# Patient Record
Sex: FEMALE | Race: WHITE | NOT HISPANIC OR LATINO | Employment: OTHER
[De-identification: names, ages, dates, MRNs, and addresses within clinical notes are randomized per-mention and may not be internally consistent; named-entity substitution may affect disease eponyms.]

---

## 2017-06-10 ENCOUNTER — HEALTH MAINTENANCE LETTER (OUTPATIENT)
Age: 52
End: 2017-06-10

## 2022-08-20 ENCOUNTER — HOSPITAL ENCOUNTER (INPATIENT)
Facility: CLINIC | Age: 57
LOS: 2 days | Discharge: HOME | DRG: 897 | End: 2022-08-22
Attending: EMERGENCY MEDICINE | Admitting: PSYCHIATRY & NEUROLOGY
Payer: MEDICARE

## 2022-08-20 ENCOUNTER — TELEPHONE (OUTPATIENT)
Dept: BEHAVIORAL HEALTH | Facility: CLINIC | Age: 57
End: 2022-08-20

## 2022-08-20 DIAGNOSIS — F10.220 ALCOHOL DEPENDENCE WITH UNCOMPLICATED INTOXICATION (H): ICD-10-CM

## 2022-08-20 DIAGNOSIS — Z11.52 ENCOUNTER FOR SCREENING LABORATORY TESTING FOR SEVERE ACUTE RESPIRATORY SYNDROME CORONAVIRUS 2 (SARS-COV-2): ICD-10-CM

## 2022-08-20 LAB
ALBUMIN SERPL-MCNC: 3.7 G/DL (ref 3.4–5)
ALP SERPL-CCNC: 141 U/L (ref 40–150)
ALT SERPL W P-5'-P-CCNC: 73 U/L (ref 0–50)
AMPHETAMINES UR QL SCN: ABNORMAL
ANION GAP SERPL CALCULATED.3IONS-SCNC: 7 MMOL/L (ref 3–14)
AST SERPL W P-5'-P-CCNC: 96 U/L (ref 0–45)
BARBITURATES UR QL: ABNORMAL
BASOPHILS # BLD AUTO: 0.1 10E3/UL (ref 0–0.2)
BASOPHILS NFR BLD AUTO: 1 %
BENZODIAZ UR QL: ABNORMAL
BILIRUB SERPL-MCNC: 0.7 MG/DL (ref 0.2–1.3)
BUN SERPL-MCNC: 7 MG/DL (ref 7–30)
CALCIUM SERPL-MCNC: 8.9 MG/DL (ref 8.5–10.1)
CANNABINOIDS UR QL SCN: ABNORMAL
CHLORIDE BLD-SCNC: 98 MMOL/L (ref 94–109)
CO2 SERPL-SCNC: 30 MMOL/L (ref 20–32)
COCAINE UR QL: ABNORMAL
CREAT SERPL-MCNC: 0.83 MG/DL (ref 0.52–1.04)
EOSINOPHIL # BLD AUTO: 0.2 10E3/UL (ref 0–0.7)
EOSINOPHIL NFR BLD AUTO: 4 %
ERYTHROCYTE [DISTWIDTH] IN BLOOD BY AUTOMATED COUNT: 14.7 % (ref 10–15)
ETHANOL SERPL-MCNC: 0.07 G/DL
GFR SERPL CREATININE-BSD FRML MDRD: 82 ML/MIN/1.73M2
GLUCOSE BLD-MCNC: 91 MG/DL (ref 70–99)
HCT VFR BLD AUTO: 43.8 % (ref 35–47)
HGB BLD-MCNC: 15.1 G/DL (ref 11.7–15.7)
IMM GRANULOCYTES # BLD: 0 10E3/UL
IMM GRANULOCYTES NFR BLD: 0 %
LIPASE SERPL-CCNC: 96 U/L (ref 73–393)
LYMPHOCYTES # BLD AUTO: 1.1 10E3/UL (ref 0.8–5.3)
LYMPHOCYTES NFR BLD AUTO: 21 %
MAGNESIUM SERPL-MCNC: 2.2 MG/DL (ref 1.6–2.3)
MCH RBC QN AUTO: 29.5 PG (ref 26.5–33)
MCHC RBC AUTO-ENTMCNC: 34.5 G/DL (ref 31.5–36.5)
MCV RBC AUTO: 86 FL (ref 78–100)
MONOCYTES # BLD AUTO: 0.5 10E3/UL (ref 0–1.3)
MONOCYTES NFR BLD AUTO: 9 %
NEUTROPHILS # BLD AUTO: 3.5 10E3/UL (ref 1.6–8.3)
NEUTROPHILS NFR BLD AUTO: 65 %
NRBC # BLD AUTO: 0 10E3/UL
NRBC BLD AUTO-RTO: 0 /100
OPIATES UR QL SCN: ABNORMAL
PLATELET # BLD AUTO: 248 10E3/UL (ref 150–450)
POTASSIUM BLD-SCNC: 2.9 MMOL/L (ref 3.4–5.3)
PROT SERPL-MCNC: 7.3 G/DL (ref 6.8–8.8)
RBC # BLD AUTO: 5.12 10E6/UL (ref 3.8–5.2)
SARS-COV-2 RNA RESP QL NAA+PROBE: NEGATIVE
SODIUM SERPL-SCNC: 135 MMOL/L (ref 133–144)
WBC # BLD AUTO: 5.3 10E3/UL (ref 4–11)

## 2022-08-20 PROCEDURE — 250N000013 HC RX MED GY IP 250 OP 250 PS 637: Performed by: PSYCHIATRY & NEUROLOGY

## 2022-08-20 PROCEDURE — 83735 ASSAY OF MAGNESIUM: CPT | Performed by: EMERGENCY MEDICINE

## 2022-08-20 PROCEDURE — 250N000009 HC RX 250: Performed by: EMERGENCY MEDICINE

## 2022-08-20 PROCEDURE — 99223 1ST HOSP IP/OBS HIGH 75: CPT | Mod: AI | Performed by: PSYCHIATRY & NEUROLOGY

## 2022-08-20 PROCEDURE — 96375 TX/PRO/DX INJ NEW DRUG ADDON: CPT

## 2022-08-20 PROCEDURE — 99285 EMERGENCY DEPT VISIT HI MDM: CPT | Performed by: EMERGENCY MEDICINE

## 2022-08-20 PROCEDURE — 96365 THER/PROPH/DIAG IV INF INIT: CPT

## 2022-08-20 PROCEDURE — 85025 COMPLETE CBC W/AUTO DIFF WBC: CPT | Performed by: EMERGENCY MEDICINE

## 2022-08-20 PROCEDURE — 96361 HYDRATE IV INFUSION ADD-ON: CPT

## 2022-08-20 PROCEDURE — 82077 ASSAY SPEC XCP UR&BREATH IA: CPT | Performed by: EMERGENCY MEDICINE

## 2022-08-20 PROCEDURE — HZ2ZZZZ DETOXIFICATION SERVICES FOR SUBSTANCE ABUSE TREATMENT: ICD-10-PCS | Performed by: PSYCHIATRY & NEUROLOGY

## 2022-08-20 PROCEDURE — 250N000011 HC RX IP 250 OP 636: Performed by: EMERGENCY MEDICINE

## 2022-08-20 PROCEDURE — 250N000009 HC RX 250

## 2022-08-20 PROCEDURE — 87635 SARS-COV-2 COVID-19 AMP PRB: CPT | Performed by: EMERGENCY MEDICINE

## 2022-08-20 PROCEDURE — 250N000013 HC RX MED GY IP 250 OP 250 PS 637: Performed by: EMERGENCY MEDICINE

## 2022-08-20 PROCEDURE — 82040 ASSAY OF SERUM ALBUMIN: CPT | Performed by: EMERGENCY MEDICINE

## 2022-08-20 PROCEDURE — C9803 HOPD COVID-19 SPEC COLLECT: HCPCS

## 2022-08-20 PROCEDURE — 80307 DRUG TEST PRSMV CHEM ANLYZR: CPT | Performed by: EMERGENCY MEDICINE

## 2022-08-20 PROCEDURE — 80053 COMPREHEN METABOLIC PANEL: CPT | Performed by: EMERGENCY MEDICINE

## 2022-08-20 PROCEDURE — 36415 COLL VENOUS BLD VENIPUNCTURE: CPT | Performed by: EMERGENCY MEDICINE

## 2022-08-20 PROCEDURE — 83690 ASSAY OF LIPASE: CPT | Performed by: EMERGENCY MEDICINE

## 2022-08-20 PROCEDURE — 258N000001 HC RX 258: Performed by: EMERGENCY MEDICINE

## 2022-08-20 PROCEDURE — 128N000004 HC R&B CD ADULT

## 2022-08-20 PROCEDURE — 99285 EMERGENCY DEPT VISIT HI MDM: CPT | Mod: 25

## 2022-08-20 RX ORDER — SUMATRIPTAN 100 MG/1
100 TABLET, FILM COATED ORAL 2 TIMES DAILY PRN
Status: ON HOLD | COMMUNITY
Start: 2022-04-08 | End: 2022-08-22

## 2022-08-20 RX ORDER — ONDANSETRON 2 MG/ML
4 INJECTION INTRAMUSCULAR; INTRAVENOUS ONCE
Status: COMPLETED | OUTPATIENT
Start: 2022-08-20 | End: 2022-08-20

## 2022-08-20 RX ORDER — DIAZEPAM 5 MG
5-20 TABLET ORAL EVERY 30 MIN PRN
Status: DISCONTINUED | OUTPATIENT
Start: 2022-08-20 | End: 2022-08-22 | Stop reason: HOSPADM

## 2022-08-20 RX ORDER — HYDROCHLOROTHIAZIDE 25 MG/1
25 TABLET ORAL DAILY
COMMUNITY

## 2022-08-20 RX ORDER — SUMATRIPTAN 100 MG/1
100 TABLET, FILM COATED ORAL ONCE
Status: COMPLETED | OUTPATIENT
Start: 2022-08-20 | End: 2022-08-20

## 2022-08-20 RX ORDER — BUSPIRONE HYDROCHLORIDE 15 MG/1
30 TABLET ORAL 2 TIMES DAILY
Status: DISCONTINUED | OUTPATIENT
Start: 2022-08-20 | End: 2022-08-22 | Stop reason: HOSPADM

## 2022-08-20 RX ORDER — LEVOTHYROXINE SODIUM 150 UG/1
150 TABLET ORAL
Status: DISCONTINUED | OUTPATIENT
Start: 2022-08-21 | End: 2022-08-22 | Stop reason: HOSPADM

## 2022-08-20 RX ORDER — HYDROCHLOROTHIAZIDE 25 MG/1
25 TABLET ORAL DAILY
Status: DISCONTINUED | OUTPATIENT
Start: 2022-08-21 | End: 2022-08-22 | Stop reason: HOSPADM

## 2022-08-20 RX ORDER — LEVOTHYROXINE SODIUM 150 UG/1
150 TABLET ORAL DAILY
COMMUNITY

## 2022-08-20 RX ORDER — ACETAMINOPHEN 500 MG
500 TABLET ORAL 2 TIMES DAILY
Status: ON HOLD | COMMUNITY
End: 2022-08-22

## 2022-08-20 RX ORDER — FOLIC ACID 1 MG/1
1 TABLET ORAL DAILY
Status: DISCONTINUED | OUTPATIENT
Start: 2022-08-20 | End: 2022-08-22 | Stop reason: HOSPADM

## 2022-08-20 RX ORDER — HYDROXYZINE HYDROCHLORIDE 25 MG/1
25 TABLET, FILM COATED ORAL EVERY 4 HOURS PRN
Status: DISCONTINUED | OUTPATIENT
Start: 2022-08-20 | End: 2022-08-22 | Stop reason: HOSPADM

## 2022-08-20 RX ORDER — TRAZODONE HYDROCHLORIDE 150 MG/1
150 TABLET ORAL AT BEDTIME
Status: DISCONTINUED | OUTPATIENT
Start: 2022-08-20 | End: 2022-08-22 | Stop reason: HOSPADM

## 2022-08-20 RX ORDER — AMOXICILLIN 250 MG
1 CAPSULE ORAL 2 TIMES DAILY PRN
Status: DISCONTINUED | OUTPATIENT
Start: 2022-08-20 | End: 2022-08-22 | Stop reason: HOSPADM

## 2022-08-20 RX ORDER — TRAZODONE HYDROCHLORIDE 50 MG/1
100-150 TABLET, FILM COATED ORAL AT BEDTIME
COMMUNITY
Start: 2022-03-15

## 2022-08-20 RX ORDER — BUSPIRONE HYDROCHLORIDE 15 MG/1
30 TABLET ORAL 2 TIMES DAILY
COMMUNITY
Start: 2022-01-31

## 2022-08-20 RX ORDER — CITALOPRAM HYDROBROMIDE 40 MG/1
40 TABLET ORAL DAILY
Status: DISCONTINUED | OUTPATIENT
Start: 2022-08-20 | End: 2022-08-22 | Stop reason: HOSPADM

## 2022-08-20 RX ORDER — ATENOLOL 50 MG/1
50 TABLET ORAL DAILY PRN
Status: DISCONTINUED | OUTPATIENT
Start: 2022-08-20 | End: 2022-08-20

## 2022-08-20 RX ORDER — ONDANSETRON 4 MG/1
4 TABLET, ORALLY DISINTEGRATING ORAL ONCE
Status: COMPLETED | OUTPATIENT
Start: 2022-08-20 | End: 2022-08-20

## 2022-08-20 RX ORDER — POTASSIUM CHLORIDE 7.45 MG/ML
10 INJECTION INTRAVENOUS ONCE
Status: COMPLETED | OUTPATIENT
Start: 2022-08-20 | End: 2022-08-20

## 2022-08-20 RX ORDER — DEXTROAMPHETAMINE SACCHARATE, AMPHETAMINE ASPARTATE MONOHYDRATE, DEXTROAMPHETAMINE SULFATE AND AMPHETAMINE SULFATE 7.5; 7.5; 7.5; 7.5 MG/1; MG/1; MG/1; MG/1
30 CAPSULE, EXTENDED RELEASE ORAL EVERY MORNING
Status: ON HOLD | COMMUNITY
Start: 2022-01-31 | End: 2022-08-22

## 2022-08-20 RX ORDER — CLONAZEPAM 1 MG/1
1.5 TABLET ORAL DAILY
Status: ON HOLD | COMMUNITY
Start: 2022-03-24 | End: 2022-08-22

## 2022-08-20 RX ORDER — VERAPAMIL HYDROCHLORIDE 240 MG/1
480 TABLET, FILM COATED, EXTENDED RELEASE ORAL DAILY
Status: DISCONTINUED | OUTPATIENT
Start: 2022-08-21 | End: 2022-08-22 | Stop reason: HOSPADM

## 2022-08-20 RX ORDER — METOPROLOL SUCCINATE 25 MG/1
25 TABLET, EXTENDED RELEASE ORAL DAILY
Status: DISCONTINUED | OUTPATIENT
Start: 2022-08-20 | End: 2022-08-22 | Stop reason: HOSPADM

## 2022-08-20 RX ORDER — LEVOTHYROXINE SODIUM 125 UG/1
150 TABLET ORAL
COMMUNITY
Start: 2022-03-09 | End: 2022-08-20

## 2022-08-20 RX ORDER — ALBUTEROL SULFATE 90 UG/1
1-2 AEROSOL, METERED RESPIRATORY (INHALATION) EVERY 4 HOURS PRN
Status: DISCONTINUED | OUTPATIENT
Start: 2022-08-20 | End: 2022-08-22 | Stop reason: HOSPADM

## 2022-08-20 RX ORDER — TRAZODONE HYDROCHLORIDE 100 MG/1
100 TABLET ORAL
Status: DISCONTINUED | OUTPATIENT
Start: 2022-08-20 | End: 2022-08-20 | Stop reason: DRUGHIGH

## 2022-08-20 RX ORDER — METOPROLOL SUCCINATE 25 MG/1
25 TABLET, EXTENDED RELEASE ORAL DAILY
COMMUNITY
Start: 2022-03-28

## 2022-08-20 RX ORDER — VERAPAMIL HYDROCHLORIDE 240 MG/1
480 TABLET, FILM COATED, EXTENDED RELEASE ORAL DAILY
COMMUNITY
Start: 2022-02-10

## 2022-08-20 RX ORDER — ACETAMINOPHEN 325 MG/1
650 TABLET ORAL EVERY 4 HOURS PRN
Status: DISCONTINUED | OUTPATIENT
Start: 2022-08-20 | End: 2022-08-22 | Stop reason: HOSPADM

## 2022-08-20 RX ORDER — ACETAMINOPHEN 500 MG
1000 TABLET ORAL ONCE
Status: COMPLETED | OUTPATIENT
Start: 2022-08-20 | End: 2022-08-20

## 2022-08-20 RX ORDER — LIDOCAINE HYDROCHLORIDE 10 MG/ML
INJECTION, SOLUTION EPIDURAL; INFILTRATION; INTRACAUDAL; PERINEURAL
Status: COMPLETED
Start: 2022-08-20 | End: 2022-08-20

## 2022-08-20 RX ORDER — DIAZEPAM 5 MG
5-20 TABLET ORAL EVERY 30 MIN PRN
Status: DISCONTINUED | OUTPATIENT
Start: 2022-08-20 | End: 2022-08-20

## 2022-08-20 RX ORDER — POTASSIUM CHLORIDE 1.5 G/1.58G
40 POWDER, FOR SOLUTION ORAL ONCE
Status: COMPLETED | OUTPATIENT
Start: 2022-08-20 | End: 2022-08-20

## 2022-08-20 RX ORDER — BENZONATATE 100 MG/1
100 CAPSULE ORAL 3 TIMES DAILY PRN
Status: DISCONTINUED | OUTPATIENT
Start: 2022-08-20 | End: 2022-08-22 | Stop reason: HOSPADM

## 2022-08-20 RX ORDER — LOSARTAN POTASSIUM 100 MG/1
100 TABLET ORAL DAILY
Status: DISCONTINUED | OUTPATIENT
Start: 2022-08-21 | End: 2022-08-22 | Stop reason: HOSPADM

## 2022-08-20 RX ORDER — LORAZEPAM 0.5 MG/1
0.5 TABLET ORAL EVERY 6 HOURS PRN
Status: ON HOLD | COMMUNITY
End: 2022-08-22

## 2022-08-20 RX ORDER — MULTIPLE VITAMINS W/ MINERALS TAB 9MG-400MCG
1 TAB ORAL DAILY
Status: DISCONTINUED | OUTPATIENT
Start: 2022-08-20 | End: 2022-08-22 | Stop reason: HOSPADM

## 2022-08-20 RX ORDER — POTASSIUM CHLORIDE 1500 MG/1
20 TABLET, EXTENDED RELEASE ORAL DAILY
COMMUNITY

## 2022-08-20 RX ORDER — SUMATRIPTAN 100 MG/1
100 TABLET, FILM COATED ORAL
Status: COMPLETED | OUTPATIENT
Start: 2022-08-20 | End: 2022-08-22

## 2022-08-20 RX ORDER — LOSARTAN POTASSIUM 100 MG/1
100 TABLET ORAL DAILY
COMMUNITY
Start: 2022-01-18

## 2022-08-20 RX ORDER — CITALOPRAM HYDROBROMIDE 40 MG/1
40 TABLET ORAL DAILY
COMMUNITY
Start: 2022-01-24

## 2022-08-20 RX ORDER — TRAZODONE HYDROCHLORIDE 50 MG/1
50 TABLET, FILM COATED ORAL
Status: DISCONTINUED | OUTPATIENT
Start: 2022-08-20 | End: 2022-08-20 | Stop reason: DRUGHIGH

## 2022-08-20 RX ORDER — ALBUTEROL SULFATE 90 UG/1
1-2 AEROSOL, METERED RESPIRATORY (INHALATION) EVERY 4 HOURS PRN
COMMUNITY

## 2022-08-20 RX ORDER — MAGNESIUM HYDROXIDE/ALUMINUM HYDROXICE/SIMETHICONE 120; 1200; 1200 MG/30ML; MG/30ML; MG/30ML
30 SUSPENSION ORAL EVERY 4 HOURS PRN
Status: DISCONTINUED | OUTPATIENT
Start: 2022-08-20 | End: 2022-08-22 | Stop reason: HOSPADM

## 2022-08-20 RX ORDER — TRAZODONE HYDROCHLORIDE 100 MG/1
100 TABLET ORAL AT BEDTIME
Status: DISCONTINUED | OUTPATIENT
Start: 2022-08-20 | End: 2022-08-22 | Stop reason: HOSPADM

## 2022-08-20 RX ADMIN — FOLIC ACID: 5 INJECTION, SOLUTION INTRAMUSCULAR; INTRAVENOUS; SUBCUTANEOUS at 10:44

## 2022-08-20 RX ADMIN — POTASSIUM CHLORIDE 40 MEQ: 1.5 FOR SOLUTION ORAL at 11:18

## 2022-08-20 RX ADMIN — CITALOPRAM HYDROBROMIDE 40 MG: 40 TABLET ORAL at 22:10

## 2022-08-20 RX ADMIN — ACETAMINOPHEN 1000 MG: 500 TABLET ORAL at 18:01

## 2022-08-20 RX ADMIN — DIAZEPAM 10 MG: 5 TABLET ORAL at 20:40

## 2022-08-20 RX ADMIN — DIAZEPAM 5 MG: 5 TABLET ORAL at 14:28

## 2022-08-20 RX ADMIN — PHENOBARBITAL 48.6 MG: 16.2 TABLET ORAL at 22:44

## 2022-08-20 RX ADMIN — ONDANSETRON 4 MG: 4 TABLET, ORALLY DISINTEGRATING ORAL at 18:02

## 2022-08-20 RX ADMIN — ONDANSETRON 4 MG: 2 INJECTION INTRAMUSCULAR; INTRAVENOUS at 11:19

## 2022-08-20 RX ADMIN — BUSPIRONE HYDROCHLORIDE 30 MG: 15 TABLET ORAL at 22:10

## 2022-08-20 RX ADMIN — SUMATRIPTAN SUCCINATE 100 MG: 100 TABLET, FILM COATED ORAL at 11:28

## 2022-08-20 RX ADMIN — Medication 1 LOZENGE: at 22:45

## 2022-08-20 RX ADMIN — LIDOCAINE HYDROCHLORIDE 10 MG: 10 INJECTION, SOLUTION EPIDURAL; INFILTRATION; INTRACAUDAL; PERINEURAL at 11:45

## 2022-08-20 RX ADMIN — POTASSIUM CHLORIDE 10 MEQ: 7.46 INJECTION, SOLUTION INTRAVENOUS at 11:41

## 2022-08-20 RX ADMIN — TRAZODONE HYDROCHLORIDE 100 MG: 100 TABLET ORAL at 22:10

## 2022-08-20 RX ADMIN — METOPROLOL SUCCINATE 25 MG: 25 TABLET, EXTENDED RELEASE ORAL at 22:10

## 2022-08-20 RX ADMIN — BENZONATATE 100 MG: 100 CAPSULE ORAL at 22:11

## 2022-08-20 RX ADMIN — DIAZEPAM 10 MG: 5 TABLET ORAL at 11:57

## 2022-08-20 ASSESSMENT — ACTIVITIES OF DAILY LIVING (ADL)
ADLS_ACUITY_SCORE: 35
ADLS_ACUITY_SCORE: 35
ADLS_ACUITY_SCORE: 30
ADLS_ACUITY_SCORE: 35
ADLS_ACUITY_SCORE: 30

## 2022-08-20 ASSESSMENT — ENCOUNTER SYMPTOMS: ABDOMINAL PAIN: 1

## 2022-08-20 ASSESSMENT — LIFESTYLE VARIABLES
AUDIT-C TOTAL SCORE: 12
SKIP TO QUESTIONS 9-10: 0

## 2022-08-20 NOTE — PHARMACY-ADMISSION MEDICATION HISTORY
Admission Medication History Completed by Pharmacy    See [facility] Admission Navigator for allergy information, preferred outpatient pharmacy, prior to admission medications and immunization status.     Medication History Sources:   Per patient and dispense report    Changes made to PTA medication list (reason):  Added: Albuterol HFA inhaler, inhale 2 puffs into the lungs every 6 hours PRN  Lorazepam 0.5 mg tablet, take 1 tablet PO q6h PRN anxiety  Potassium Chloride 20MEQ tablet, take 1 tablet PO once daily  Tylenol 500 mg tablet, take 1 tablet PO two times daily  Buspirone SIG --> take 2 tablets PO two times daily  Deleted: None  Changed: Levothyroxine 125 mcg tablet --> Levothyroxine 150 mcg tablet (on dispense report and verified by patient)    Additional Information:  Levothyroxine was written as 125 mcg but the SIG said to take 150 mcg. Removed the 125 mcg and added a 150 mcg to remove confusion.   Buspirone had no SIG so the patient reported how she takes it and that was added in.   Tylenol and occassionally Aleve are used for arthritis pain. Patient reports Aleve is not as frequent due to stomach issues if taken too often.  Patient desires to have her medications taken at certain times in the day to help with her endocrine system.   Morning: adderall, hydrochlorothiazide, levothyroxine, losartan, verapamil, tylenol and buspirone  Omeprazole should be taken a few hours after her other morning medications. Potassium chloride is new and patient did not report when she takes this medication.  Evening: Clonazepam, metoprolol, trazodone, tylenol, and buspirone     Prior to Admission medications    Medication Sig Last Dose Taking? Auth Provider Long Term End Date   acetaminophen (TYLENOL) 500 MG tablet Take 500 mg by mouth 2 times daily 8/20/2022 Yes Unknown, Entered By History     albuterol (PROAIR HFA/PROVENTIL HFA/VENTOLIN HFA) 108 (90 Base) MCG/ACT inhaler Inhale 1-2 puffs into the lungs every 4 hours as needed  for shortness of breath Unknown Yes Unknown, Entered By History Yes    amphetamine-dextroamphetamine (ADDERALL XR) 30 MG 24 hr capsule Take 30 mg by mouth every morning 8/20/2022 at AM Yes Reported, Patient     busPIRone (BUSPAR) 15 MG tablet Take 30 mg by mouth 2 times daily 8/20/2022 at AM Yes Reported, Patient Yes    citalopram (CELEXA) 40 MG tablet Take 40 mg by mouth daily 8/19/2022 at PM Yes Reported, Patient Yes    clonazePAM (KLONOPIN) 1 MG tablet Take 1.5 mg by mouth daily 8/19/2022 at PM Yes Reported, Patient Yes    hydrochlorothiazide (HYDRODIURIL) 25 MG tablet Take 25 mg by mouth daily 8/20/2022 at AM Yes Reported, Patient Yes    levothyroxine (SYNTHROID/LEVOTHROID) 150 MCG tablet Take 150 mcg by mouth daily 8/20/2022 at AM Yes Unknown, Entered By History Yes    LORazepam (ATIVAN) 0.5 MG tablet Take 0.5 mg by mouth every 6 hours as needed for anxiety Past Week Yes Unknown, Entered By History     losartan (COZAAR) 100 MG tablet Take 100 mg by mouth daily 8/20/2022 at AM Yes Reported, Patient Yes    metoprolol succinate ER (TOPROL XL) 25 MG 24 hr tablet Take 25 mg by mouth daily 8/19/2022 at PM Yes Reported, Patient Yes    omeprazole (PRILOSEC) 20 MG DR capsule Take 20 mg by mouth daily 8/20/2022 at AM Yes Reported, Patient     potassium chloride ER (K-TAB) 20 MEQ CR tablet Take 20 mEq by mouth daily Past Week Yes Unknown, Entered By History     SUMAtriptan (IMITREX) 100 MG tablet Take 100 mg by mouth twice daily as needed for migraine. Give at least 2 hours apart. Max dose 200 mg per 24 hours. Past Week Yes Reported, Patient     traZODone (DESYREL) 50 MG tablet Take 100-150 mg by mouth At Bedtime 8/19/2022 at PM Yes Reported, Patient Yes    verapamil ER (CALAN-SR) 240 MG CR tablet Take 480 mg by mouth daily 8/20/2022 at AM Yes Reported, Patient Yes        Date completed: 08/20/22    Medication history completed by: Abdon Odonnell, Pharmacy Student Year 2

## 2022-08-20 NOTE — TELEPHONE ENCOUNTER
S: 11:11am, Dr. Barillas called w/ clinical on a 56/F present in the [facility] requesting detox.       B: The pt arrived at the [facility] requesting alcohol detox. The pt drinks up to 750 ml of vodka, daily, for years. Last known drink was early this morning. BA was .07. Potassium is being replaced    MSSA Protocol (5): Valium.    Pt denies any additional substance use.    The pt s does currently have withdrawal symptoms: Shakes.    The pt does not have a concern/Hx for DT s, the pt does not have a Hx/concern for seizures.     The pt has no medical concerns. -Chronic epigastric and left hip pain.    The pt can ambulated independently.     There are no MH concerns w/ admission.    The pt has not been in detox before.    No concern for aggression or HI.     Covid -Negative  Utox has been collected.    CBC labs resulted- see Epic for results.    Comp labs are as follows: Sodium 135; Potassium 2.9; AST 96; ALT 73.  Lipase: 96    Recent vital signs on 8/20/2022: temp 98.1; Pulse 64; /91; Resp Rate 16, SpO2 98%.     A: Vol    R: Patient cleared and ready for behavioral bed placement: Yes    11:34am Intake paged Dr. Haynes.     12:17pm Intake received a call from Dr. Haynes. Pt has been accepted for placement [facility].     3A/CD Admit/[facility].     12:25pm Intake called [facility] and provided disposition to charge nurse. Nurse report: Charge will call the ED.    12:28pm Intake called [place] [facility] and provided placement information to [facility].

## 2022-08-20 NOTE — H&P
Patient was seen in the emergency room    Brittani Linares is a 56 year old female    History was provided by APOLLO who was a fair historian.   CHIEF COMPLAINT: Alcohol    HISTORY OF PRESENT ILLNESS:      Patient is a 50-year-old  female who is on disability.  Patient has a longstanding history of alcoholism.  She last went to treatment in 2018 2019 she was sober for 1 year.  Subsequently she relapsed and has not been able to stay sober.  She reports that she is not able to do  for her grandchildren she is fed up of drinking and she realized that she needs to get help and brought herself here.  She drinks about 50 mL of vodka daily sometimes extra beer and wine            Patient has tolerance, withdrawal, progressive use, loss of control, spending more time and more amount than intended. Patient has made attempts to quit, is experiencing cravings, and reports negative consequences.                  alcohol   USE DISORDER - CRITERIA  +admits to taking larger amounts than initially intended  +admits to unsuccessful efforts to cut down or control use  +admits to spending a great deal of time in activities necessary to obtain, use and recover from  +admits to cravings or a strong desire to use   + admits to failure to fulfill obligations at work, school or home  + admits to continued use despite negative consequences  + admits to giving up important activities to use  +admits to use in situations in which it is physically dangerous      She has no history of DTs or seizures  She is prescribed clonazepam 1.5 mg he takes that for a year this is prescribed by his sleep doctor because she has sleep apnea and has a hard time wearing her sleep apnea mask             Denies thoughts of suicide or harming others.      Denies auditory or visual hallucinations.     Patient smokes 0 cigarettes    Patient denied any gambling    Substance Age first use First became regular or problematic Most recent use   Alcohol          Cannabis      Cocaine NONE       Stimulants NONE       Opioids NONE       Sedatives NONE       Hallucinogens NONE       Inhalants NONE       Other         OTC drugs NONE       Nicotine         Overdose IV drug use      PSYCHIATRIC REVIEW OF SYSTEMS:         Psychiatric Review of Systems:   Depression:    Patient take Celexa she reports have a time she feels lack of energy lack of motivation lack of interest and isolated poor sleep  Corrina:       Denies: sleeplessness, increased goal-directed activities, abrupt increase in energy, pressured speech   impulsiveness, racing thoughts, increased goal-directed activities, pressured speech, increase in energy  Corrina Feeling euphoric,Distractible,Impulsive,Grandiose,Talking excessively,Have energy without sleeping,Mood swings,Irritability  Psychosis:     Denies: visual hallucinations, auditory hallucinations, paranoia  Anxiety:   Reports: excessive worries that are difficult to control for the past 6 months,   Chronic anxiety , not able to stop worrying impacting sleep, poor conc, irritable , muscle tension fatigue    denies any Panic attacks  Pt has following s/o of anxiety  Palpitation pounding heart trembling shaking shortness of breath feeling of choking chest pain nausea feeling dizzy chills numbness derealization fear of dying fear of losing control    Come out of the blue    Denies: worries that are difficult to control for the past 6 months, panic attacks    Social anxiety disorder  Denies  : Marked anxiety about 1 or more social situations in which patient is exposed to scrutiny ofothers and patient fears patient will act in the way that patient that will be negatively  causes significant impairment  Patient is afraid of being judged scrutinized  Patient avoids social situations  PTSD: Patient was exposed to a traumatic event  Reports: re-experiencing past trauma, nightmares, trust issues, flashbacks,increased arousal, avoidance of traumatic stimuli, impaired  function.  Negative cognition  hypervigilance  .  OCD:   denies obsessions, patient has compulsions checking, counting symmetry, cleaning, skin picking.    ED:     Denies: restriction, binging, purging.         patient denies :symptoms of attention deficit disorder include a failure to pay attention to detail, a pattern of careless mistakes, a pattern of inattentive listening, a failure to follow through with projects, poor personal organization, losing necessary objects, distractibility, forgetfulness.       patient denies Symptoms of borderline personality disorder include a fear of abandonment, unstable self-image, impulsive behavior, affective instability due to marked reactivity and mood lasting hours dissociative feeling, intense anger, unstable personal relationships, chronic feelings of boredom, periods of intense depressed mood.  Transient stressed related paranoid ideation severe dissociative symptoms              PSYCHIATRIC HISTORY     Previous diagnoses: Depression and anxiety      Symptoms in relation to substance use (symptoms present without use present     Past court commitments: none  SIB /SUICIDE ATTEMPTS NONE  Psych Hosp : None  Outpatient Programs once  Inpatient cd trt none  Out pt cd trt once  Detoxes once  PAST PSYCH MED TRIALS   Celexcolton [place]      SOCIAL HISTORY                                                                             Patient is single has a boyfriend  Patient has 1 child children  She is on disability          Family History:   FAMILY HISTORY: History reviewed. No pertinent family history.  Family Mental Health History-  n depression runs in her family    Substance Use Problems - present for alcoholism in father's sister             PTA Medications:     Current Facility-Administered Medications   Medication     diazepam (VALIUM) tablet 5-20 mg     Current Outpatient Medications   Medication     amphetamine-dextroamphetamine (ADDERALL XR) 30 MG 24 hr capsule     busPIRone  (BUSPAR) 15 MG tablet     citalopram (CELEXA) 40 MG tablet     clonazePAM (KLONOPIN) 1 MG tablet     hydrochlorothiazide (HYDRODIURIL) 25 MG tablet     levothyroxine (SYNTHROID/LEVOTHROID) 125 MCG tablet     losartan (COZAAR) 100 MG tablet     metoprolol succinate ER (TOPROL XL) 25 MG 24 hr tablet     omeprazole (PRILOSEC) 20 MG DR capsule     SUMAtriptan (IMITREX) 100 MG tablet     traZODone (DESYREL) 50 MG tablet     verapamil ER (CALAN-SR) 240 MG CR tablet          Allergies:     Allergies   Allergen Reactions     Azithromycin      Ceclor [Cefaclor]      Doxycycline      Keflex [Cephalexin]      Lactose GI Disturbance     To milk and creamer     Lisinopril Cough     Penicillins      Sulfa Drugs Rash          Labs:     Recent Results (from the past 48 hour(s))   Asymptomatic COVID-19 Virus (Coronavirus) by PCR Nasopharyngeal    Collection Time: 08/20/22 10:28 AM    Specimen: Nasopharyngeal; Swab   Result Value Ref Range    SARS CoV2 PCR Negative Negative   Comprehensive metabolic panel    Collection Time: 08/20/22 10:41 AM   Result Value Ref Range    Sodium 135 133 - 144 mmol/L    Potassium 2.9 (L) 3.4 - 5.3 mmol/L    Chloride 98 94 - 109 mmol/L    Carbon Dioxide (CO2) 30 20 - 32 mmol/L    Anion Gap 7 3 - 14 mmol/L    Urea Nitrogen 7 7 - 30 mg/dL    Creatinine 0.83 0.52 - 1.04 mg/dL    Calcium 8.9 8.5 - 10.1 mg/dL    Glucose 91 70 - 99 mg/dL    Alkaline Phosphatase 141 40 - 150 U/L    AST 96 (H) 0 - 45 U/L    ALT 73 (H) 0 - 50 U/L    Protein Total 7.3 6.8 - 8.8 g/dL    Albumin 3.7 3.4 - 5.0 g/dL    Bilirubin Total 0.7 0.2 - 1.3 mg/dL    GFR Estimate 82 >60 mL/min/1.73m2   Magnesium    Collection Time: 08/20/22 10:41 AM   Result Value Ref Range    Magnesium 2.2 1.6 - 2.3 mg/dL   Lipase    Collection Time: 08/20/22 10:41 AM   Result Value Ref Range    Lipase 96 73 - 393 U/L   Ethyl Alcohol Level    Collection Time: 08/20/22 10:41 AM   Result Value Ref Range    Alcohol ethyl 0.07 (H) <=0.01 g/dL   CBC with platelets  and differential    Collection Time: 08/20/22 10:41 AM   Result Value Ref Range    WBC Count 5.3 4.0 - 11.0 10e3/uL    RBC Count 5.12 3.80 - 5.20 10e6/uL    Hemoglobin 15.1 11.7 - 15.7 g/dL    Hematocrit 43.8 35.0 - 47.0 %    MCV 86 78 - 100 fL    MCH 29.5 26.5 - 33.0 pg    MCHC 34.5 31.5 - 36.5 g/dL    RDW 14.7 10.0 - 15.0 %    Platelet Count 248 150 - 450 10e3/uL    % Neutrophils 65 %    % Lymphocytes 21 %    % Monocytes 9 %    % Eosinophils 4 %    % Basophils 1 %    % Immature Granulocytes 0 %    NRBCs per 100 WBC 0 <1 /100    Absolute Neutrophils 3.5 1.6 - 8.3 10e3/uL    Absolute Lymphocytes 1.1 0.8 - 5.3 10e3/uL    Absolute Monocytes 0.5 0.0 - 1.3 10e3/uL    Absolute Eosinophils 0.2 0.0 - 0.7 10e3/uL    Absolute Basophils 0.1 0.0 - 0.2 10e3/uL    Absolute Immature Granulocytes 0.0 <=0.4 10e3/uL    Absolute NRBCs 0.0 10e3/uL   Drug abuse screen 1 urine (ED)    Collection Time: 08/20/22 11:31 AM   Result Value Ref Range    Amphetamines Urine Screen Positive (A) Screen Negative    Barbiturates Urine Screen Negative Screen Negative    Benzodiazepines Urine Screen Negative Screen Negative    Cannabinoids Urine Screen Negative Screen Negative    Cocaine Urine Screen Negative Screen Negative    Opiates Urine Screen Negative Screen Negative         /82   Pulse 60   Temp 98.2  F (36.8  C)   Resp 17   Wt 102.5 kg (226 lb)   SpO2 96%   Weight is 226 lbs 0 oz  There is no height or weight on file to calculate BMI.    Physical Exam:     ROS: 10 point ROS neg other than the symptoms noted above in the HPI.            Past Medical History:   PAST MEDICAL HISTORY:   Past Medical History:   Diagnosis Date     Anxiety      Arthritis      Degenerative arthritis of spine      Depressive disorder      Fibromyalgia      Gastroesophageal reflux disease      Hypertension      Migraine      RODGER (obstructive sleep apnea)        PAST SURGICAL HISTORY:   Past Surgical History:   Procedure Laterality Date     ABDOMEN SURGERY        HEAD & NECK SURGERY         -    -           MENTAL STATUS EXAM:      Constitutional: General appearance of patient:  Appearance:  awake, alert, appeared as age stated, adequate groomed and slightly unkempt  Attitude:  cooperative  Eye Contact:  good  Mood:   Flat  Affect:  congruent   Speech:  clear, coherent normal rate   Psychomotor Behavior:  no evidence of tardive dyskinesia, dystonia, or tics  Thought Process:  logical, linear and goal oriented  Associations:  no loose associations  Thought Content:  no evidence of psychotic thought and active suicidal ideation present  Denied any active suicidal /homicidation ideation plan intent   Insight:  fair  Judgment:  fair  Oriented to:  time, person, and place  Attention Span and Concentration:  intact  Recent and Remote Memory:  intact  Language:  english with appropriate syntax and vocabulary  Fund of Knowledge: appropriate  Muscle Strength and Tone: normal  Gait and Station: Normal     There are no abnormal or psychotic thoughts, no preoccupations, no overvalued ideas, no rumination, no obsessions, no compulsions, no somatic concerns, no hypochrondriasis, no ideas of reference, and no delusions.  Patient denies homicidal thoughts.   Patient denies suicidal thoughts.  Patient appears to have good judgment and good insight.     Musculoskeletal: Patient shows no abnormalities of motor activity: there is no tremor, no tic, and no dystonia.  There is no apparent muscle atrophy, strength and tone appear normal, and there are no abnormal movements.  Patient has normal gait and stance.    DISCUSSION:         Assessment:       Patient has a biological predisposition with family history positive for depression and anxiety alcohol dependence  Psychologically patient is experiencing alcohol use depression and anxiety  Patient has these particular stressors lack of structure  Patient has chronic illness exacerbation leading to hospitalization progression as described.      Patient has been unable to stop using drugs in the community due to both physical and psychological symptoms.  Continued use will put the patient at risk for medical and/or psychiatric complications.      Inpatient psychiatric hospitalization is warranted at this time for safety, stabilization, and possible adjustment in medications.       Diagnoses:    Alcohol use disorder severe  Alcohol withdrawal severe  Major depressive disorder moderate recurrent without psychosis  Generalized anxiety disorder  Clonazepam withdrawal          Plan:   Problem list  1#alcohol use disorder severe alcohol withdrawal severe     - MSSA protocol using Valium for management of alcohol withdrawal    - Continue thiamine, folate, and multivitamin daily    MSSA    Eating Disturbances: 6  Tremor: 2  Sleep Disturbance: slept through the night or not applicable  Clouding of Sensorium: no evidence  Hallucinations: 0 - none  Quality of Contact: 0 - awareness of examiner and people around him/her  Agitation: 1 - somewhat more than normal activity  Paroxysmal Sweats: 0 - no observed sweating  Temperature: 99.5 or below  Pulse: 0 - 69 or below  Total MSSA Score: 10    2#for depression and the patient was continued on Celexa and BuSpar    3#patient has hypokalemia 2.9 with internal medicine consult  4#patient has regular liver enzymes AST 96 ALT 73 most likely from alcoholism nonviral suspected  5#patient will be detoxed off clonazepam using phenobarbital 45 mg at bedtime  Patient takes Adderall which will be held until patient completes detox as it increases risk of seizures    - Consider anti-craving medications prior to discharge. Pt willing to review additional information about both naltrexone and Antabuse.    Alcohol withdrawal nausea prn Zofran as needed for nausea     hydroxyzine 25 mg q4h prn for acute anxiety  Trazodone 50 mg at bedtime prn for sleep disturbances       Patient has been unable to stop using drugs in the community due to  both physical and psychological symptoms.  Continued use will put the patient at risk for medical and/or psychiatric complications.    I HAVE REVIEWED LABS WITH PT AND TALKED ABOUT RESULTS WITH PT  I HAVE REVIEWED AND SUMMARIZED OLD RECORDS including his medication reconcilation of his home medications  and PDMP   I HAVE SPOKEN WITH RN ABOUT MEDICATIONS AND DETOX SCORES  I HAVE SPOKEN WITH CM ABOUT PTS TREATMENT OPTIONS     Discussed in detail about patient's smoking patient was advised to quit patient was told about the impact of smoking.  Patient's willingness to quit was assessed.  I provided methods and skills for cessation including medication management nicotine gum patch.  Patient did not set a quit date.  Patient is interested in quitting .we discussed pharmacotherapy options .patient agreed to take nicotine gum patch lozenge.  We discussed behavioral change techniques when craving nicotine including deep breathing drinking glass of water, taking a walk.            Laboratory/Imaging:    Liver Function Studies -   Recent Labs   Lab Test 08/20/22  1041   PROTTOTAL 7.3   ALBUMIN 3.7   BILITOTAL 0.7   ALKPHOS 141   AST 96*   ALT 73*      Last Comprehensive Metabolic Panel:  Sodium   Date Value Ref Range Status   08/20/2022 135 133 - 144 mmol/L Final     Potassium   Date Value Ref Range Status   08/20/2022 2.9 (L) 3.4 - 5.3 mmol/L Final     Chloride   Date Value Ref Range Status   08/20/2022 98 94 - 109 mmol/L Final     Carbon Dioxide (CO2)   Date Value Ref Range Status   08/20/2022 30 20 - 32 mmol/L Final     Anion Gap   Date Value Ref Range Status   08/20/2022 7 3 - 14 mmol/L Final     Glucose   Date Value Ref Range Status   08/20/2022 91 70 - 99 mg/dL Final     Urea Nitrogen   Date Value Ref Range Status   08/20/2022 7 7 - 30 mg/dL Final     Creatinine   Date Value Ref Range Status   08/20/2022 0.83 0.52 - 1.04 mg/dL Final     GFR Estimate   Date Value Ref Range Status   08/20/2022 82 >60 mL/min/1.73m2 Final  "    Comment:     Effective December 21, 2021 eGFRcr in adults is calculated using the 2021 CKD-EPI creatinine equation which includes age and gender (Chano et al., NEJM, DOI: 10.1056/NNLLfg4236577)     Calcium   Date Value Ref Range Status   08/20/2022 8.9 8.5 - 10.1 mg/dL Final     Bilirubin Total   Date Value Ref Range Status   08/20/2022 0.7 0.2 - 1.3 mg/dL Final     Alkaline Phosphatase   Date Value Ref Range Status   08/20/2022 141 40 - 150 U/L Final     ALT   Date Value Ref Range Status   08/20/2022 73 (H) 0 - 50 U/L Final     AST   Date Value Ref Range Status   08/20/2022 96 (H) 0 - 45 U/L Final                   Medical treatment/interventions:  Medical concerns: As above    - Consults: IM consult placed. Appreciate assistance.     Legal Status: Voluntary     Safety Assessment:   Checks: Status 15  Pt has not required locked seclusion or restraints in the past 24 hours to maintain safety, please refer to RN documentation for further details.    The risks, benefits, alternatives and side effects have been discussed and are understood by the patient.       Patient will be treated in therapeutic milieu with appropriate individual and group therapies as described.  Disposition: Pending clinical stabilization. Pt does  appear interested in COMPLETE DETOX AND DO TRT  Length of stay 3-5 days        \"Much or all of the text in this note was generated through the use of Dragon Dictate voice to text software. Errors in spelling or words which appear to be out of contact are unintentional, may be present due having escaped editing\"     "

## 2022-08-20 NOTE — ED NOTES
[facility]   ED Nurse to Floor Handoff     Brittani Linares is a 56 year old female who speaks English and lives alone,  in a home  They arrived in the ED by car from emergency room    ED Chief Complaint: Alcohol Problem and Abdominal Pain    ED Dx;   Final diagnoses:   Alcohol dependence with uncomplicated intoxication (H)         Needed?: No    Allergies:   Allergies   Allergen Reactions     Azithromycin      Ceclor [Cefaclor]      Doxycycline      Keflex [Cephalexin]      Lactose GI Disturbance     To milk and creamer     Lisinopril Cough     Penicillins      Sulfa Drugs Rash   .  Past Medical Hx:   Past Medical History:   Diagnosis Date     Anxiety      Arthritis      Degenerative arthritis of spine      Depressive disorder      Fibromyalgia      Gastroesophageal reflux disease      Hypertension      Migraine      RODGER (obstructive sleep apnea)       Baseline Mental status: WDL  Current Mental Status changes: at basesline    Infection present or suspected this encounter: no  Sepsis suspected: No  Isolation type: No active isolations  Patient tested for COVID 19 prior to admission: YES     Activity level - Baseline/Home:  Independent  Activity Level - Current:   Independent    Bariatric equipment needed?: No    In the ED these meds were given:   Medications   diazepam (VALIUM) tablet 5-20 mg (5 mg Oral Given 8/20/22 1428)   dextrose 5% and 0.45% NaCl 1,000 mL with Infuvite Adult 10 mL, thiamine 100 mg, folic acid 1 mg infusion ( Intravenous Stopped 8/20/22 1150)   SUMAtriptan (IMITREX) tablet 100 mg (100 mg Oral Given 8/20/22 1128)   potassium chloride 10 mEq in 100 mL sterile water intermittent infusion (premix) (0 mEq Intravenous Stopped 8/20/22 1241)   potassium chloride (KLOR-CON) Packet 40 mEq (40 mEq Oral Given 8/20/22 1118)   ondansetron (ZOFRAN) injection 4 mg (4 mg Intravenous Given 8/20/22 1119)   lidocaine (PF) (XYLOCAINE) 1 % injection (10 mg   Given 8/20/22 1145)   ondansetron (ZOFRAN ODT) ODT tab 4 mg (4 mg Oral Given 8/20/22 1802)   acetaminophen (TYLENOL) tablet 1,000 mg (1,000 mg Oral Given 8/20/22 1801)       Drips running?  No    Home pump  No    Current LDAs  Peripheral IV 08/20/22 Right Hand (Active)   Site Assessment WDL 08/20/22 1040   Line Status Saline locked 08/20/22 1040   Dressing Intervention New dressing  08/20/22 1040   Phlebitis Scale 0-->no symptoms 08/20/22 1040   Infiltration Scale 0 08/20/22 1040   Number of days: 0       Labs results:   Labs Ordered and Resulted from Time of ED Arrival to Time of ED Departure   COMPREHENSIVE METABOLIC PANEL - Abnormal       Result Value    Sodium 135      Potassium 2.9 (*)     Chloride 98      Carbon Dioxide (CO2) 30      Anion Gap 7      Urea Nitrogen 7      Creatinine 0.83      Calcium 8.9      Glucose 91      Alkaline Phosphatase 141      AST 96 (*)     ALT 73 (*)     Protein Total 7.3      Albumin 3.7      Bilirubin Total 0.7      GFR Estimate 82     ETHYL ALCOHOL LEVEL - Abnormal    Alcohol ethyl 0.07 (*)    DRUG ABUSE SCREEN 1 URINE (ED) - Abnormal    Amphetamines Urine Screen Positive (*)     Barbiturates Urine Screen Negative      Benzodiazepines Urine Screen Negative      Cannabinoids Urine Screen Negative      Cocaine Urine Screen Negative      Opiates Urine Screen Negative     MAGNESIUM - Normal    Magnesium 2.2     LIPASE - Normal    Lipase 96     COVID-19 VIRUS (CORONAVIRUS) BY PCR - Normal    SARS CoV2 PCR Negative     CBC WITH PLATELETS AND DIFFERENTIAL    WBC Count 5.3      RBC Count 5.12      Hemoglobin 15.1      Hematocrit 43.8      MCV 86      MCH 29.5      MCHC 34.5      RDW 14.7      Platelet Count 248      % Neutrophils 65      % Lymphocytes 21      % Monocytes 9      % Eosinophils 4      % Basophils 1      % Immature Granulocytes 0      NRBCs per 100 WBC 0      Absolute Neutrophils 3.5      Absolute Lymphocytes 1.1      Absolute Monocytes 0.5      Absolute Eosinophils 0.2       Absolute Basophils 0.1      Absolute Immature Granulocytes 0.0      Absolute NRBCs 0.0         Imaging Studies: No results found for this or any previous visit (from the past 24 hour(s)).    Recent vital signs:   /81   Pulse 72   Temp 98.5  F (36.9  C) (Oral)   Resp 26   Wt 102.5 kg (226 lb)   SpO2 96%     Ramon Coma Scale Score: 15 (08/20/22 1839)       Cardiac Rhythm: Normal Sinus  Pt needs tele? No  Skin/wound Issues: None    Code Status: Full Code    Pain control: good    Nausea control: good    Abnormal labs/tests/findings requiring intervention:     Family present during ED course? No   Family Comments/Social Situation comments:     Tasks needing completion: None    Aristeo Finn RN  [facility] --   [phone] [place] ED  [phone] [facility]

## 2022-08-20 NOTE — ED PROVIDER NOTES
[facility] EMERGENCY DEPARTMENT ([facility])     August 20, 2022     History     Chief Complaint   Patient presents with     Alcohol Problem     Abdominal Pain     HPI  Brittani Linares is a 56 year old female with a PMH of s/p gastric bypass surgery (patient-reported) who presents to the Emergency Department for detox. Patient reports that for the last 15 years she has been drinking on and off. She states that recently she has been drinking at a minimum 750 mL of vodka a day, sometimes also with extra beer or wine. Last drink was this morning. No history of withdrawal seizures, but she does endorse withdrawal symptoms like shakiness. She had labs performed at [facility] recently which showed elevation in her liver and kidney and says she does not want to keep drinking like this. Denies taking any drugs, though she does note that she has prescription medications. No thoughts of hurting herself or others.    The patient also reports severe upper abdominal pain recently which she associates with the drinking.  She states that when she is drinking heavily she often experiences abdominal distention and constipation.    Past Medical History  Past Medical History:   Diagnosis Date     Anxiety      Arthritis      Degenerative arthritis of spine      Depressive disorder      Fibromyalgia      Gastroesophageal reflux disease      Hypertension      Migraine      RODGER (obstructive sleep apnea)      Past Surgical History:   Procedure Laterality Date     ABDOMEN SURGERY       HEAD & NECK SURGERY       amphetamine-dextroamphetamine (ADDERALL XR) 30 MG 24 hr capsule  busPIRone (BUSPAR) 15 MG tablet  citalopram (CELEXA) 40 MG tablet  clonazePAM (KLONOPIN) 1 MG tablet  hydrochlorothiazide (HYDRODIURIL) 25 MG tablet  levothyroxine (SYNTHROID/LEVOTHROID) 125 MCG tablet  losartan (COZAAR) 100 MG tablet  metoprolol succinate ER (TOPROL XL) 25 MG 24 hr tablet  omeprazole (PRILOSEC) 20 MG DR capsule  SUMAtriptan (IMITREX) 100 MG  tablet  traZODone (DESYREL) 50 MG tablet  verapamil ER (CALAN-SR) 240 MG CR tablet      Allergies   Allergen Reactions     Azithromycin      Ceclor [Cefaclor]      Doxycycline      Keflex [Cephalexin]      Lactose GI Disturbance     To milk and creamer     Lisinopril Cough     Penicillins      Sulfa Drugs Rash     Family History  History reviewed. No pertinent family history.  Social History   Social History     Tobacco Use     Smoking status: Never Smoker     Smokeless tobacco: Never Used   Substance Use Topics     Alcohol use: Yes     Comment: 750 mL     Drug use: Never      Past medical history, past surgical history, medications, allergies, family history, and social history were reviewed with the patient. No additional pertinent items.       Review of Systems   Gastrointestinal: Positive for abdominal pain.   Psychiatric/Behavioral: Negative for self-injury and suicidal ideas.   All other systems reviewed and are negative.    A complete review of systems was performed with pertinent positives and negatives noted in the HPI, and all other systems negative.    Physical Exam   BP: 104/66  Pulse: 64  Temp: 98.1  F (36.7  C)  Resp: 16  Weight: 102.5 kg (226 lb)  SpO2: 98 %  Physical Exam  Vitals and nursing note reviewed.   Constitutional:       General: She is not in acute distress.     Appearance: She is well-developed. She is not ill-appearing, toxic-appearing or diaphoretic.   HENT:      Head: Normocephalic and atraumatic.      Mouth/Throat:      Lips: Pink.      Mouth: Mucous membranes are moist.      Pharynx: Oropharynx is clear. No oropharyngeal exudate.   Eyes:      General: Lids are normal. No scleral icterus.     Extraocular Movements: Extraocular movements intact.      Right eye: No nystagmus.      Left eye: No nystagmus.      Conjunctiva/sclera: Conjunctivae normal.      Pupils: Pupils are equal, round, and reactive to light.   Neck:      Thyroid: No thyromegaly.      Vascular: No JVD.      Trachea: No  tracheal deviation.   Cardiovascular:      Rate and Rhythm: Normal rate and regular rhythm.      Pulses: Normal pulses.      Heart sounds: Normal heart sounds. No murmur heard.    No friction rub. No gallop.   Pulmonary:      Effort: Pulmonary effort is normal. No respiratory distress.      Breath sounds: Normal breath sounds.   Abdominal:      General: Bowel sounds are normal. There is no distension.      Palpations: Abdomen is soft. There is no mass.      Tenderness: There is no abdominal tenderness. There is no guarding or rebound.   Musculoskeletal:         General: No tenderness. Normal range of motion.      Cervical back: Normal range of motion and neck supple. No erythema or rigidity.      Right lower leg: No edema.      Left lower leg: No edema.   Lymphadenopathy:      Cervical: No cervical adenopathy.   Skin:     General: Skin is warm and dry.      Capillary Refill: Capillary refill takes less than 2 seconds.      Coloration: Skin is not pale.      Findings: No erythema or rash.   Neurological:      Mental Status: She is alert and oriented to person, place, and time.      Cranial Nerves: No cranial nerve deficit.      Sensory: No sensory deficit.      Motor: Motor function is intact.   Psychiatric:         Attention and Perception: Attention and perception normal.         Mood and Affect: Mood and affect normal.         Speech: Speech normal.         Behavior: Behavior normal.         Thought Content: Thought content is not paranoid or delusional. Thought content does not include homicidal or suicidal ideation.         ED Course     9:50 AM  The patient was seen and examined by Jermain Barillas MD in Room ED07.    Procedures                Results for orders placed or performed during the hospital encounter of 08/20/22   Comprehensive metabolic panel     Status: Abnormal   Result Value Ref Range    Sodium 135 133 - 144 mmol/L    Potassium 2.9 (L) 3.4 - 5.3 mmol/L    Chloride 98 94 - 109 mmol/L    Carbon  Dioxide (CO2) 30 20 - 32 mmol/L    Anion Gap 7 3 - 14 mmol/L    Urea Nitrogen 7 7 - 30 mg/dL    Creatinine 0.83 0.52 - 1.04 mg/dL    Calcium 8.9 8.5 - 10.1 mg/dL    Glucose 91 70 - 99 mg/dL    Alkaline Phosphatase 141 40 - 150 U/L    AST 96 (H) 0 - 45 U/L    ALT 73 (H) 0 - 50 U/L    Protein Total 7.3 6.8 - 8.8 g/dL    Albumin 3.7 3.4 - 5.0 g/dL    Bilirubin Total 0.7 0.2 - 1.3 mg/dL    GFR Estimate 82 >60 mL/min/1.73m2   Magnesium     Status: Normal   Result Value Ref Range    Magnesium 2.2 1.6 - 2.3 mg/dL   Lipase     Status: Normal   Result Value Ref Range    Lipase 96 73 - 393 U/L   Ethyl Alcohol Level     Status: Abnormal   Result Value Ref Range    Alcohol ethyl 0.07 (H) <=0.01 g/dL   Asymptomatic COVID-19 Virus (Coronavirus) by PCR Nasopharyngeal     Status: Normal    Specimen: Nasopharyngeal; Swab   Result Value Ref Range    SARS CoV2 PCR Negative Negative    Narrative    Testing was performed using the lacie  SARS-CoV-2 & Influenza A/B Assay on the lacie  Rebecca  System.  This test should be ordered for the detection of SARS-COV-2 in individuals who meet SARS-CoV-2 clinical and/or epidemiological criteria. Test performance is unknown in asymptomatic patients.  This test is for in vitro diagnostic use under the FDA EUA for laboratories certified under CLIA to perform moderate and/or high complexity testing. This test has not been FDA cleared or approved.  A negative test does not rule out the presence of PCR inhibitors in the specimen or target RNA in concentration below the limit of detection for the assay. The possibility of a false negative should be considered if the patient's recent exposure or clinical presentation suggests COVID-19.  [facility] are certified under the Clinical Laboratory Improvement Amendments of 1988 (CLIA-88) as qualified to perform moderate and/or high complexity laboratory testing.   CBC with platelets and differential     Status: None   Result Value Ref Range     WBC Count 5.3 4.0 - 11.0 10e3/uL    RBC Count 5.12 3.80 - 5.20 10e6/uL    Hemoglobin 15.1 11.7 - 15.7 g/dL    Hematocrit 43.8 35.0 - 47.0 %    MCV 86 78 - 100 fL    MCH 29.5 26.5 - 33.0 pg    MCHC 34.5 31.5 - 36.5 g/dL    RDW 14.7 10.0 - 15.0 %    Platelet Count 248 150 - 450 10e3/uL    % Neutrophils 65 %    % Lymphocytes 21 %    % Monocytes 9 %    % Eosinophils 4 %    % Basophils 1 %    % Immature Granulocytes 0 %    NRBCs per 100 WBC 0 <1 /100    Absolute Neutrophils 3.5 1.6 - 8.3 10e3/uL    Absolute Lymphocytes 1.1 0.8 - 5.3 10e3/uL    Absolute Monocytes 0.5 0.0 - 1.3 10e3/uL    Absolute Eosinophils 0.2 0.0 - 0.7 10e3/uL    Absolute Basophils 0.1 0.0 - 0.2 10e3/uL    Absolute Immature Granulocytes 0.0 <=0.4 10e3/uL    Absolute NRBCs 0.0 10e3/uL   CBC with platelets differential     Status: None    Narrative    The following orders were created for panel order CBC with platelets differential.  Procedure                               Abnormality         Status                     ---------                               -----------         ------                     CBC with platelets and d...[[id number]]                      Final result                 Please view results for these tests on the individual orders.   Urine Drugs of Abuse Screen     Status: None ()    Narrative    The following orders were created for panel order Urine Drugs of Abuse Screen.  Procedure                               Abnormality         Status                     ---------                               -----------         ------                     Drug abuse screen 1 urin...[[id number]]                                                   Please view results for these tests on the individual orders.     Medications   diazepam (VALIUM) tablet 5-20 mg (has no administration in time range)   potassium chloride 10 mEq in 100 mL sterile water intermittent infusion (premix) (has no administration in time range)   lidocaine (PF) (XYLOCAINE) 1 %  injection (has no administration in time range)   dextrose 5% and 0.45% NaCl 1,000 mL with Infuvite Adult 10 mL, thiamine 100 mg, folic acid 1 mg infusion ( Intravenous New Bag 8/20/22 1044)   SUMAtriptan (IMITREX) tablet 100 mg (100 mg Oral Given 8/20/22 1128)   potassium chloride (KLOR-CON) Packet 40 mEq (40 mEq Oral Given 8/20/22 1118)   ondansetron (ZOFRAN) injection 4 mg (4 mg Intravenous Given 8/20/22 1119)        Assessments & Plan (with Medical Decision Making)     This patient presented to the emergency department seeking detox for alcohol dependence.  She has been drinking this morning but does not appear significantly intoxicated.  She was placed on the MSSA protocol but has not needed any medication at this point in time.  Medical work-up demonstrated mild transaminase elevation and no signs of pancreatitis.  Serum potassium level was mildly low and was treated with IV and oral replacement.  Patient also received Imitrex for headache as she states that she usually takes this when she begins to get a migraine.  She was also given Zofran.  At this point time I can find no acute medical or psychiatric issue that would preclude an admission to the detox unit.  I did discuss the case with chemical dependency intake and they are working on arranging a bed.    I have reviewed the nursing notes. I have reviewed the findings, diagnosis, plan and need for follow up with the patient.    New Prescriptions    No medications on file       Final diagnoses:   Alcohol dependence with uncomplicated intoxication (H)     IJaimie, am serving as a trained medical scribe to document services personally performed by Ulices Barillas MD, based on the provider's statements to me.   Ulices RICCI MD, was physically present and have reviewed and verified the accuracy of this note documented by Jaimie Bradley.     --  Jermain Barillas Md  [facility] EMERGENCY DEPARTMENT  8/20/2022     Jermain Barillas  MD [place]  08/20/22 7325

## 2022-08-20 NOTE — ED TRIAGE NOTES
Pt reports drinking 750 mL daily last drink at 8AM. Pt reports no seizure or DT history. Pt reports not being to detox at this facility. Pt calm and cooperative and ambulating independently. Pt not SI/HI.   Pt reports chronic epigastric and left hip pain. Pt reports hx of arthritis and fibromyalgia.      Triage Assessment     Row Name 08/20/22 0970       Triage Assessment (Adult)    Airway WDL WDL       Respiratory WDL    Respiratory WDL WDL       Skin Circulation/Temperature WDL    Skin Circulation/Temperature WDL WDL       Cardiac WDL    Cardiac WDL WDL       Peripheral/Neurovascular WDL    Peripheral Neurovascular WDL WDL       Cognitive/Neuro/Behavioral WDL    Cognitive/Neuro/Behavioral WDL WDL

## 2022-08-21 ENCOUNTER — APPOINTMENT (OUTPATIENT)
Dept: ULTRASOUND IMAGING | Facility: CLINIC | Age: 57
DRG: 897 | End: 2022-08-21
Attending: STUDENT IN AN ORGANIZED HEALTH CARE EDUCATION/TRAINING PROGRAM
Payer: MEDICARE

## 2022-08-21 LAB
ALBUMIN SERPL-MCNC: 3 G/DL (ref 3.4–5)
ALP SERPL-CCNC: 114 U/L (ref 40–150)
ALT SERPL W P-5'-P-CCNC: 51 U/L (ref 0–50)
ANION GAP SERPL CALCULATED.3IONS-SCNC: 6 MMOL/L (ref 3–14)
AST SERPL W P-5'-P-CCNC: 44 U/L (ref 0–45)
BILIRUB SERPL-MCNC: 0.7 MG/DL (ref 0.2–1.3)
BUN SERPL-MCNC: 11 MG/DL (ref 7–30)
CALCIUM SERPL-MCNC: 8.7 MG/DL (ref 8.5–10.1)
CHLORIDE BLD-SCNC: 104 MMOL/L (ref 94–109)
CO2 SERPL-SCNC: 31 MMOL/L (ref 20–32)
CREAT SERPL-MCNC: 1 MG/DL (ref 0.52–1.04)
GFR SERPL CREATININE-BSD FRML MDRD: 65 ML/MIN/1.73M2
GGT SERPL-CCNC: 150 U/L (ref 0–40)
GLUCOSE BLD-MCNC: 91 MG/DL (ref 70–99)
HBA1C MFR BLD: 5.2 % (ref 0–5.6)
POTASSIUM BLD-SCNC: 3.8 MMOL/L (ref 3.4–5.3)
PROT SERPL-MCNC: 6.1 G/DL (ref 6.8–8.8)
SODIUM SERPL-SCNC: 141 MMOL/L (ref 133–144)
TSH SERPL DL<=0.005 MIU/L-ACNC: 2.87 MU/L (ref 0.4–4)

## 2022-08-21 PROCEDURE — 84443 ASSAY THYROID STIM HORMONE: CPT | Performed by: PSYCHIATRY & NEUROLOGY

## 2022-08-21 PROCEDURE — 82040 ASSAY OF SERUM ALBUMIN: CPT | Performed by: STUDENT IN AN ORGANIZED HEALTH CARE EDUCATION/TRAINING PROGRAM

## 2022-08-21 PROCEDURE — 82977 ASSAY OF GGT: CPT | Performed by: PSYCHIATRY & NEUROLOGY

## 2022-08-21 PROCEDURE — 99222 1ST HOSP IP/OBS MODERATE 55: CPT | Performed by: STUDENT IN AN ORGANIZED HEALTH CARE EDUCATION/TRAINING PROGRAM

## 2022-08-21 PROCEDURE — 83036 HEMOGLOBIN GLYCOSYLATED A1C: CPT | Performed by: PSYCHIATRY & NEUROLOGY

## 2022-08-21 PROCEDURE — 76705 ECHO EXAM OF ABDOMEN: CPT

## 2022-08-21 PROCEDURE — 76705 ECHO EXAM OF ABDOMEN: CPT | Mod: 26 | Performed by: RADIOLOGY

## 2022-08-21 PROCEDURE — 250N000013 HC RX MED GY IP 250 OP 250 PS 637: Performed by: STUDENT IN AN ORGANIZED HEALTH CARE EDUCATION/TRAINING PROGRAM

## 2022-08-21 PROCEDURE — 250N000013 HC RX MED GY IP 250 OP 250 PS 637: Performed by: PSYCHIATRY & NEUROLOGY

## 2022-08-21 PROCEDURE — H2032 ACTIVITY THERAPY, PER 15 MIN: HCPCS

## 2022-08-21 PROCEDURE — 128N000004 HC R&B CD ADULT

## 2022-08-21 PROCEDURE — 250N000011 HC RX IP 250 OP 636: Performed by: PSYCHIATRY & NEUROLOGY

## 2022-08-21 PROCEDURE — 80053 COMPREHEN METABOLIC PANEL: CPT | Performed by: STUDENT IN AN ORGANIZED HEALTH CARE EDUCATION/TRAINING PROGRAM

## 2022-08-21 PROCEDURE — 36415 COLL VENOUS BLD VENIPUNCTURE: CPT | Performed by: PSYCHIATRY & NEUROLOGY

## 2022-08-21 RX ORDER — ONDANSETRON 4 MG/1
4 TABLET, ORALLY DISINTEGRATING ORAL EVERY 6 HOURS PRN
Status: DISCONTINUED | OUTPATIENT
Start: 2022-08-21 | End: 2022-08-22 | Stop reason: HOSPADM

## 2022-08-21 RX ORDER — CLONIDINE HYDROCHLORIDE 0.1 MG/1
0.1 TABLET ORAL EVERY 6 HOURS PRN
Status: DISCONTINUED | OUTPATIENT
Start: 2022-08-21 | End: 2022-08-22 | Stop reason: HOSPADM

## 2022-08-21 RX ORDER — FUROSEMIDE 20 MG
20 TABLET ORAL DAILY
Status: DISCONTINUED | OUTPATIENT
Start: 2022-08-21 | End: 2022-08-22 | Stop reason: HOSPADM

## 2022-08-21 RX ORDER — POTASSIUM CHLORIDE 20MEQ/15ML
20 LIQUID (ML) ORAL DAILY
Status: DISCONTINUED | OUTPATIENT
Start: 2022-08-21 | End: 2022-08-22 | Stop reason: HOSPADM

## 2022-08-21 RX ADMIN — FUROSEMIDE 20 MG: 20 TABLET ORAL at 15:34

## 2022-08-21 RX ADMIN — TRAZODONE HYDROCHLORIDE 100 MG: 100 TABLET ORAL at 22:32

## 2022-08-21 RX ADMIN — OMEPRAZOLE 20 MG: 20 CAPSULE, DELAYED RELEASE ORAL at 11:06

## 2022-08-21 RX ADMIN — METOPROLOL SUCCINATE 25 MG: 25 TABLET, EXTENDED RELEASE ORAL at 19:32

## 2022-08-21 RX ADMIN — DIAZEPAM 10 MG: 5 TABLET ORAL at 01:25

## 2022-08-21 RX ADMIN — ACETAMINOPHEN 650 MG: 325 TABLET, FILM COATED ORAL at 09:58

## 2022-08-21 RX ADMIN — HYDROCHLOROTHIAZIDE 25 MG: 25 TABLET ORAL at 09:25

## 2022-08-21 RX ADMIN — THIAMINE HCL TAB 100 MG 100 MG: 100 TAB at 09:25

## 2022-08-21 RX ADMIN — ALUMINUM HYDROXIDE, MAGNESIUM HYDROXIDE, AND SIMETHICONE 30 ML: 200; 200; 20 SUSPENSION ORAL at 05:26

## 2022-08-21 RX ADMIN — ONDANSETRON 4 MG: 4 TABLET, ORALLY DISINTEGRATING ORAL at 19:33

## 2022-08-21 RX ADMIN — LOSARTAN POTASSIUM 100 MG: 100 TABLET, FILM COATED ORAL at 09:25

## 2022-08-21 RX ADMIN — CITALOPRAM HYDROBROMIDE 40 MG: 40 TABLET ORAL at 19:32

## 2022-08-21 RX ADMIN — VERAPAMIL HYDROCHLORIDE 480 MG: 240 TABLET ORAL at 13:27

## 2022-08-21 RX ADMIN — BUSPIRONE HYDROCHLORIDE 30 MG: 15 TABLET ORAL at 19:32

## 2022-08-21 RX ADMIN — DIAZEPAM 10 MG: 5 TABLET ORAL at 09:30

## 2022-08-21 RX ADMIN — POTASSIUM CHLORIDE 20 MEQ: 20 SOLUTION ORAL at 15:36

## 2022-08-21 RX ADMIN — BENZONATATE 100 MG: 100 CAPSULE ORAL at 19:32

## 2022-08-21 RX ADMIN — LEVOTHYROXINE SODIUM 150 MCG: 150 TABLET ORAL at 09:25

## 2022-08-21 RX ADMIN — FOLIC ACID 1 MG: 1 TABLET ORAL at 09:25

## 2022-08-21 RX ADMIN — ACETAMINOPHEN 650 MG: 325 TABLET, FILM COATED ORAL at 22:37

## 2022-08-21 RX ADMIN — PHENOBARBITAL 48.6 MG: 16.2 TABLET ORAL at 22:32

## 2022-08-21 RX ADMIN — BUSPIRONE HYDROCHLORIDE 30 MG: 15 TABLET ORAL at 09:25

## 2022-08-21 ASSESSMENT — ACTIVITIES OF DAILY LIVING (ADL)
ADLS_ACUITY_SCORE: 30
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
## 2022-08-21 NOTE — PROGRESS NOTES
SPIRITUAL HEALTH SERVICES  SPIRITUAL ASSESSMENT Progress Note  [facility] ([facility]   ON-CALL VISIT    REFERRAL SOURCE: Hospital  request upon admission.    In consultation with Kolby Peter's RN, he stated that it would be appropriate to have the unit  visit with her tomorrow on 8/22.     PLAN: Unit  will be notified for follow up.    Roz Barron  Lead [facility]   Pager [phone]    [facility] remains available 24/7 for emergent requests/referrals, either by having the switchboard page the on-call  or by entering an ASAP/STAT consult in Epic (this will also page the on-call ).

## 2022-08-21 NOTE — PLAN OF CARE
Problem: Alcohol Withdrawal  Goal: Alcohol Withdrawal Symptom Control  Outcome: Ongoing, Progressing   Goal Outcome Evaluation:    The patient continues on MSSA for alcohol withdrawal. Scored 9 and  2 Patient slept through the night. The patient denied pain and all mental health symptoms. Respiration is regular and unlabored. The 15-minutes safety checks were carried out through this shift without incidence. Will continue to monitor.

## 2022-08-21 NOTE — CONSULTS
[facility]  Internal Medicine Consult     Brittani Linares MRN# [id number]   Age: 56 year old YOB: 1965     Date of Admission: 8/20/2022  Date of Consult: 8/21/2022    Primary Care Provider: Angelica Canchola [facility] - [place]    Requesting Service: Behavioral Health - Gerald Barone MD  Reason for Consult: General Medical Evaluation      SUBJECTIVE   CC:   Alcohol withdrawal    Assessment and Plan/Recommendations:     Brittani Linares is a 56 year old female with a PMH of s/p gastric bypass surgery, HTN, migraines, GERD, anxiety, depression, and alcohol abuse. She reported to the ED seeking detox. Was admitted to Detox unit with medicine following for co-managment.     Addendum 1412  Pt had no medical complaints this morning but asked to be seen this afternoon for 1) LE edema and 2) epigastric discomfort. On discussion she reports that the LE edema has been present on and off for decades but worse over the past 2 months. No CP, SOB or palpitations. The epigastric pain has been present on and off for even longer, mostly since she had her gastric bypass surgery, however, she reports it is more uncomfortably currently in the setting of EtOH use and her ongoing cough. Notably, she does have some RUQ/epigasrtic tenderness and subjectively positive Cavazos's sign, however, she reports hx of cholecystectomy     # LE Edema  - PO lasix 20 mg x 1   - Give 20 mEq of KCl PO   - Check CMP in the AM to monitor potassium   - Continue PTA BP meds including hydrochlorothiazide   - Echocardiogram ordered. If this is unable to get done as an inpatient it should not delay discharge and can be deferred to outpatient.    # RUQ pain and tenderness, chronic   # Transaminitis   # Hx of gastric bypass   ALT 73, AST 96 but alk phos and bilirubin normal.  Repeat CMP this morning shows improvement in LFTs. Pt wanted me to assess her for abdominal pain this afternoon. This sounds like a chronic  issue that has been present for several years and in the setting of mulitple prior abdominal surgeries including gastric bypass, multiple hernia surgeries, and prior cholecystectomy. However, she reports it is worse over the past couple of days in the setting of increased EtOH use. She does have some RUQ and epigastric tenderness and appreciable + Cavazos's signo on exam but her abdomen is otherwise benign. Her vitals are stable, afebrile. I do not suspect any acute infectious intraabdominal process. I suspect the nature of her discomfort is chronic   - Ordered RUQ US for further evaluation  - Repeat CMP in the AM. It is reassuring that her LFTs have improved since admission.     # Alcohol withdrawal, hx of alcohol use disorder   MSSA 2 this shift.  Denies hx of withdrawal seizures.  Pt reports drinking 750 ml vodka dialy plus beer and wine. Blood EtOH on arrival was 0.07.   - Continue MSSA   - Folvite, multi-vites, thiamine supplementation   - Further management per Psychiatry     # Cough   # GERD   Pt reports cough for the past few days. Has a harsh, dry cough while in room. COVID swab negative   - Tessalon pearls 100 mg TID PRN for cough  - PO Protonix     # Hypokalemia - resolved   K 2.9 in the ER. Likely secondary to GI losses in setting of withdrawal plus being on thiazide diuretic. Received 10 mEq IV replacement plus 40 mEq PO replacement.    - Repeat K this morning normal at 3.8    # Hypertension   On Losartan 100 mg, Metoprolol XL 25 mg, and hydrochlorothiazide 25 mg PTA. Also Verapamil for migraine prevention.  - Continue PTA Losartan, Metoprolol and hydrochlorothiazide  - Continue PTA Potassium supplementation   - Clonidine 0.1 mg PRN for SBP > 170 or DBP >110    # Hypothyroidism  - PTA Levothyroxine     # Migraines   - PTA Verapamil   - PTA Sumatriptan as needed     # Anxiety   # Depression  # ADHD  On trazadone, Celexa, and Ativan PRN PTA. Amphetamines elevated on Utox but pt is on Adderall.   -  management per psychiatry       Medicine will continue to follow for above lab monitoring and for echo and RUQ US that were ordered. However, please note that none of these tests are deemed urgent and if she is unable to complete RUQ US and Echo prior to discharge then it is acceptable to have these done as an outpt.       Madhu Quintana PA-C  Internal Medicine [facility] Hospitalist  Page job code 9750 (3B), 4370 (3A), or 1243 ([facility] and 4A)  Text paging via [employer] is appreciated  August 21, 2022         HPI:   Brittani Linares is a 56 year old female with a PMH of s/p gastric bypass surgery, HTN, migraines, GERD, anxiety, depression, and alcohol abuse. She reported to the ED seeking detox. She reports drinking at least 750 ml of vodka daily plus some beer and wine at times. Denies hx of withdrawal seizures. In the ED, she reported significant abdominal pain in the setting of EtOH use. Lipase was normal. ALT 73, AST 96 but alk phos and bilirubin normal. Hypokalemic with K 2.9. Blood EtOH 0.07.  Was admitted to Detox unit with medicine following for co-managment.     On my discussion with pt this morning she reports doing well overall this morning. Fells a little shaky. She reports having a cough for the past few days but no sx of fevers, chills or night sweats. No significant SOB. Denies abdominal pain but mildly nauseated, no vomiting. Denies dysuria. Denies hx of withdrawal seizures.        Past Medical History:     Past Medical History:   Diagnosis Date     Anxiety      Arthritis      Degenerative arthritis of spine      Depressive disorder      Fibromyalgia      Gastroesophageal reflux disease      Hypertension      Migraine      RODGER (obstructive sleep apnea)         Reviewed and updated in [facility].     Past Surgical History:      Past Surgical History:   Procedure Laterality Date     ABDOMEN SURGERY       HEAD & NECK SURGERY           Social History:     Social History     Tobacco Use     Smoking status: Never  "Smoker     Smokeless tobacco: Never Used   Substance Use Topics     Alcohol use: Yes     Comment: 750 mL     Drug use: Never        Family History:   History reviewed. No pertinent family history.      Allergies:     Allergies   Allergen Reactions     Azithromycin      Ceclor [Cefaclor]      Doxycycline      Keflex [Cephalexin]      Lisinopril Cough     Penicillins      Sulfa Drugs Rash         Medications:   Reviewed. Please see MAR     Review of Systems:   10 point ROS of systems including Constitutional, Eyes, Respiratory, Cardiovascular, Gastroenterology, Genitourinary, Integumentary, Muscularskeletal, Psychiatric were all negative except for pertinent positives noted in my HPI.    OBJECTIVE   Physical Exam:   Vitals were reviewed  Blood pressure 138/87, pulse 76, temperature 97.2  F (36.2  C), temperature source Temporal, resp. rate 16, height 1.626 m (5' 4\"), weight 102.5 kg (226 lb), SpO2 96 %.  General: Alert and oriented x3. Somewhat tremulous. Has a cough     HEENT: Anicteric sclera, MMM  Cardiovascular: RRR, S1S2. No murmur noted  Lungs: CTAB without wheezing or crackles   GI: Abdomen soft, with some RUQ tenderness and seemingly + Cavazos's sign. She gas faded midline abdominal incision secondary to prior gastric bypass.. + Bowel sounds.  Vascular: No peripheral edema, distal pulses palpable  Neurologic: No focal deficits, CN II-XII grossly intact  Skin: No jaundice, rashes, or lesions        Data:        Lab Results   Component Value Date     08/20/2022    Lab Results   Component Value Date    CHLORIDE 98 08/20/2022    Lab Results   Component Value Date    BUN 7 08/20/2022      Lab Results   Component Value Date    POTASSIUM 2.9 08/20/2022    Lab Results   Component Value Date    CO2 30 08/20/2022    Lab Results   Component Value Date    CR 0.83 08/20/2022        Lab Results   Component Value Date    WBC 5.3 08/20/2022    HGB 15.1 08/20/2022    HCT 43.8 08/20/2022    MCV 86 08/20/2022     " 08/20/2022     Lab Results   Component Value Date    WBC 5.3 08/20/2022

## 2022-08-21 NOTE — PROGRESS NOTES
08/20/22 1945   Patient Belongings   Did you bring any home meds/supplements to the hospital?  Yes   Disposition of meds  Other (see comment)  (medroom bin)   Patient Belongings other (see comments)  (storage bin, med room bin)   Belongings Search Yes   Clothing Search Yes   Second Staff Johana   Storage bin: tote bag, keys  Med room bin: cell phone, raeann, env # [id number]: meds from home  A               Admission:  I am responsible for any personal items that are not sent to the safe or pharmacy.  [facility] is not responsible for loss, theft or damage of any property in my possession.    Signature:  _________________________________ Date: _______  Time: _____                                              Staff Signature:  ____________________________ Date: ________  Time: _____      2nd Staff person, if patient is unable/unwilling to sign:    Signature: ________________________________ Date: ________  Time: _____     Discharge:  [facility] has returned all of my personal belongings:    Signature: _________________________________ Date: ________  Time: _____                                          Staff Signature:  ____________________________ Date: ________  Time: _____

## 2022-08-21 NOTE — PLAN OF CARE
Problem: Alcohol Withdrawal  Goal: Alcohol Withdrawal Symptom Control  8/21/2022 1413 by Russel Orozco, RN  Outcome: Ongoing, Progressing  8/21/2022 0447 by Russel Orozco, RN  Outcome: Ongoing, Progressing   Goal Outcome Evaluation:     The patient continues on MSSA for alcohol withdrawal. Scored 8 and 4 during this shift. Patient's affect is flat but brightens upon approach. Patient is calm and cooperative with her cares. Pt was compliant to medication and there was no side effects noted or observed. Patient reported swelling in her lower-right leg; this writer assessed the edema and it was patten less than 1/2 a inch deep when pressed. Patient denied pain in lower leg but reported pain of 3/10 in lower back and stomach. Internal medicine was consulted and came in to see patient. Patient denied SI, SIB, HI. And AV/H. Will continue to monitor.

## 2022-08-22 ENCOUNTER — APPOINTMENT (OUTPATIENT)
Dept: CARDIOLOGY | Facility: CLINIC | Age: 57
DRG: 897 | End: 2022-08-22
Attending: STUDENT IN AN ORGANIZED HEALTH CARE EDUCATION/TRAINING PROGRAM
Payer: MEDICARE

## 2022-08-22 VITALS
OXYGEN SATURATION: 95 % | HEIGHT: 64 IN | TEMPERATURE: 97.4 F | WEIGHT: 226 LBS | DIASTOLIC BLOOD PRESSURE: 84 MMHG | RESPIRATION RATE: 16 BRPM | BODY MASS INDEX: 38.58 KG/M2 | HEART RATE: 69 BPM | SYSTOLIC BLOOD PRESSURE: 144 MMHG

## 2022-08-22 LAB
ALBUMIN SERPL-MCNC: 3.2 G/DL (ref 3.4–5)
ALP SERPL-CCNC: 124 U/L (ref 40–150)
ALT SERPL W P-5'-P-CCNC: 48 U/L (ref 0–50)
ANION GAP SERPL CALCULATED.3IONS-SCNC: 4 MMOL/L (ref 3–14)
AST SERPL W P-5'-P-CCNC: 31 U/L (ref 0–45)
BILIRUB SERPL-MCNC: 0.6 MG/DL (ref 0.2–1.3)
BUN SERPL-MCNC: 10 MG/DL (ref 7–30)
CALCIUM SERPL-MCNC: 9 MG/DL (ref 8.5–10.1)
CHLORIDE BLD-SCNC: 101 MMOL/L (ref 94–109)
CO2 SERPL-SCNC: 33 MMOL/L (ref 20–32)
CREAT SERPL-MCNC: 0.92 MG/DL (ref 0.52–1.04)
GFR SERPL CREATININE-BSD FRML MDRD: 72 ML/MIN/1.73M2
GLUCOSE BLD-MCNC: 137 MG/DL (ref 70–99)
HOLD SPECIMEN: NORMAL
LVEF ECHO: NORMAL
POTASSIUM BLD-SCNC: 3.4 MMOL/L (ref 3.4–5.3)
PROT SERPL-MCNC: 6.8 G/DL (ref 6.8–8.8)
SODIUM SERPL-SCNC: 138 MMOL/L (ref 133–144)

## 2022-08-22 PROCEDURE — 99239 HOSP IP/OBS DSCHRG MGMT >30: CPT | Performed by: PSYCHIATRY & NEUROLOGY

## 2022-08-22 PROCEDURE — 250N000013 HC RX MED GY IP 250 OP 250 PS 637: Performed by: STUDENT IN AN ORGANIZED HEALTH CARE EDUCATION/TRAINING PROGRAM

## 2022-08-22 PROCEDURE — 80053 COMPREHEN METABOLIC PANEL: CPT | Performed by: STUDENT IN AN ORGANIZED HEALTH CARE EDUCATION/TRAINING PROGRAM

## 2022-08-22 PROCEDURE — 93306 TTE W/DOPPLER COMPLETE: CPT

## 2022-08-22 PROCEDURE — 93306 TTE W/DOPPLER COMPLETE: CPT | Mod: 26 | Performed by: INTERNAL MEDICINE

## 2022-08-22 PROCEDURE — 250N000013 HC RX MED GY IP 250 OP 250 PS 637: Performed by: PSYCHIATRY & NEUROLOGY

## 2022-08-22 PROCEDURE — 36415 COLL VENOUS BLD VENIPUNCTURE: CPT | Performed by: STUDENT IN AN ORGANIZED HEALTH CARE EDUCATION/TRAINING PROGRAM

## 2022-08-22 PROCEDURE — 82040 ASSAY OF SERUM ALBUMIN: CPT | Performed by: STUDENT IN AN ORGANIZED HEALTH CARE EDUCATION/TRAINING PROGRAM

## 2022-08-22 RX ORDER — MULTIPLE VITAMINS W/ MINERALS TAB 9MG-400MCG
1 TAB ORAL DAILY
Qty: 30 TABLET | Refills: 0 | Status: SHIPPED | OUTPATIENT
Start: 2022-08-23

## 2022-08-22 RX ORDER — LANOLIN ALCOHOL/MO/W.PET/CERES
100 CREAM (GRAM) TOPICAL DAILY
Qty: 30 TABLET | Refills: 0 | Status: SHIPPED | OUTPATIENT
Start: 2022-08-23

## 2022-08-22 RX ORDER — PHENOBARBITAL 16.2 MG/1
48.6 TABLET ORAL AT BEDTIME
Qty: 15 TABLET | Refills: 0 | Status: SHIPPED | OUTPATIENT
Start: 2022-08-22 | End: 2022-08-27

## 2022-08-22 RX ADMIN — LOSARTAN POTASSIUM 100 MG: 100 TABLET, FILM COATED ORAL at 08:52

## 2022-08-22 RX ADMIN — VERAPAMIL HYDROCHLORIDE 480 MG: 240 TABLET ORAL at 08:52

## 2022-08-22 RX ADMIN — MULTIPLE VITAMINS W/ MINERALS TAB 1 TABLET: TAB at 08:52

## 2022-08-22 RX ADMIN — FOLIC ACID 1 MG: 1 TABLET ORAL at 08:52

## 2022-08-22 RX ADMIN — BUSPIRONE HYDROCHLORIDE 30 MG: 15 TABLET ORAL at 08:52

## 2022-08-22 RX ADMIN — OMEPRAZOLE 20 MG: 20 CAPSULE, DELAYED RELEASE ORAL at 08:53

## 2022-08-22 RX ADMIN — HYDROCHLOROTHIAZIDE 25 MG: 25 TABLET ORAL at 08:52

## 2022-08-22 RX ADMIN — ACETAMINOPHEN 650 MG: 325 TABLET, FILM COATED ORAL at 04:09

## 2022-08-22 RX ADMIN — FUROSEMIDE 20 MG: 20 TABLET ORAL at 08:52

## 2022-08-22 RX ADMIN — POTASSIUM CHLORIDE 20 MEQ: 20 SOLUTION ORAL at 08:53

## 2022-08-22 RX ADMIN — LEVOTHYROXINE SODIUM 150 MCG: 150 TABLET ORAL at 08:52

## 2022-08-22 RX ADMIN — SUMATRIPTAN SUCCINATE 100 MG: 100 TABLET, FILM COATED ORAL at 04:52

## 2022-08-22 RX ADMIN — THIAMINE HCL TAB 100 MG 100 MG: 100 TAB at 08:52

## 2022-08-22 ASSESSMENT — ACTIVITIES OF DAILY LIVING (ADL)
ADLS_ACUITY_SCORE: 30

## 2022-08-22 ASSESSMENT — ANXIETY QUESTIONNAIRES
3. WORRYING TOO MUCH ABOUT DIFFERENT THINGS: NEARLY EVERY DAY
GAD7 TOTAL SCORE: 16
5. BEING SO RESTLESS THAT IT IS HARD TO SIT STILL: MORE THAN HALF THE DAYS
2. NOT BEING ABLE TO STOP OR CONTROL WORRYING: MORE THAN HALF THE DAYS
GAD7 TOTAL SCORE: 16
7. FEELING AFRAID AS IF SOMETHING AWFUL MIGHT HAPPEN: MORE THAN HALF THE DAYS
6. BECOMING EASILY ANNOYED OR IRRITABLE: MORE THAN HALF THE DAYS
4. TROUBLE RELAXING: MORE THAN HALF THE DAYS
1. FEELING NERVOUS, ANXIOUS, OR ON EDGE: NEARLY EVERY DAY

## 2022-08-22 ASSESSMENT — PATIENT HEALTH QUESTIONNAIRE - PHQ9: SUM OF ALL RESPONSES TO PHQ QUESTIONS 1-9: 18

## 2022-08-22 NOTE — PLAN OF CARE
Goal Outcome Evaluation:    Plan of Care Reviewed With: patient   Pt was in her room, out for meals. Continues to be on MSSA this morning, had a score of 4 and is OOD as from 0930 this morning. She denied psych sx. She had adequate intake. She was medicated with tylenol for headache.   Pt discharging today. Pt discharged today. Pt's belongings were returned. Writer reviewed AVS with pt and pt verbalized understanding. Pt denies SI/HI, agrees to contract for safety out in the community. PA escorted pt out of the unit.

## 2022-08-22 NOTE — DISCHARGE INSTRUCTIONS
Behavioral Discharge Planning and Instructions  THANK YOU FOR CHOOSING [facility]  3AW  [phone]    Summary: You were admitted to Station 3A on 8/20/22 for detoxification from alcohol.  A medical exam was performed that included lab work. You have met with a  and opted to follow-up with outpatient treatment at [facility].  Please take care and make your recovery a daily priority, Brittani!  It was a pleasure working with you and the entire treatment team here wishes you the very best in your recovery!     Recommendation:  Outpatient Treatment    Main Diagnoses:  Per Dr. Gerald Barone MD;  303.90 (F10.20) Alcohol Use Disorder Severe    Major Treatments, Procedures and Findings:  You were treated for alcohol detoxification using [facility] protocol. You had a chemical dependency assessment. You had labs drawn and those results were reviewed with you. Please take a copy of your lab work with you to your next primary care provider appointment.    Symptoms to Report:  If you experience more anxiety, confusion, sleeplessness, deep sadness or thoughts of suicide, notify your treatment team or notify your primary care provider. IF ANY OF THE SYMPTOMS YOU ARE EXPERIENCING ARE A MEDICAL EMERGENCY CALL 911 IMMEDIATELY.     Lifestyle Adjustment: Adjust your lifestyle to get enough sleep, relaxation, exercise and good nutrition. Continue to develop healthy coping skills to decrease stress and promote a sober living environment. Do not use mood altering substances including alcohol, illegal drugs or addictive medications other than what is currently prescribed.     Disposition: Home    Facts about COVID19 at www.cdc.gov/COVID19 and www.[place].gov/covid19    Keeping hands clean is one of the most important steps we can take to avoid getting sick and spreading germs to others.  Please wash your hands frequently and lather with soap for at least 20 seconds!    Medical Follow-Up:  ***    Treatment Follow-Up:  JIGNESH  [facility] evening [facility]  Outpatient intake department will be calling to schedule your intake to this program  Aditi Reese: [phone]    Recovery apps for your phone to locate current in person and zoom recovery meetings  [place] - meeting karina  AA  - meeting karina  Meeting guide - meeting karina  Quick NA meeting - meeting karina  [place]- has various apps    Resources:  *due to covid-19 most AA/NA meetings are being held online however some are in-person or a hybrid combination please check online to verify*  AA meetings search for them at: https://aa-intergroup.org (worldwide meeting listings)  AA meetings for [place] area can be found online at: https://a[place].org (click local online meetings listings)  NA meetings for [place] area can be found online at: https://www.nam[place].org  (click find a meeting)  AA and NA Sponsors are excellent resources for support and you can find one at any support group meeting.   Alcoholics Anonymous (https://aa.org/): for information 24 hours/day  AA Intergroup service office in [place] (http://www.aastpa[place].org/) 236.617.3474  AA Intergroup service office in [facility]: [phone]. (http://www.aaminneaLi Creative Technologiesis.org/)  Narcotics Anonymous (www.nam[place].org) (999) 224-3172  https://aafairviewriverside.org/meetings  SMART Recovery - self management for addiction recovery:  www.smartrecovery.org  Pathways ~ A Health Crisis Resource & Support Center:  447.657.8456.  https://prescribetoprevent.org/patient-education/videos/  http://www.harmreduction.org  [facility] 948-247-0915  Support Group:  AA/NA and Sponsor/support.  National [place] on Mental Illness (www.[place].ad.org): 219.128.8699 or 421-621-4122.  Alcoholics Anonymous (www.alcoholics-anonymous.org): Check your phone book for your local chapter.  Suicide Awareness Voices of Education (SAVE) (www.save.org): 136-724-BTQX (5390)  National Suicide Prevention Line  (www.mentalhealthmn.org): 224-809-PRYH (5063)  Mental Health Consumer/Survivor Network of [place] (www.mhcsn.net): 818.678.3965 or 001-537-2879  Mental Health Association of [place] (www.mentalhealth.org): 874.330.4596 or 836-141-9073   Substance Abuse and Mental Health Services (www.sama.gov)  [place] Opioid Prevention Coalition: www.opioidcoalition.org    [place] Recovery Connection (MRC)  [facility] connects people seeking recovery to resources that help foster and sustain long-term recovery.  Whether you are seeking resources for treatment, transportation, housing, job training, education, health care or other pathways to recovery, [facility] is a great place to start.  721.262.2127.  www.[employer].Sanghvi Pod casts for nutrition and wellness  Listen on Apple Podcasts  [employer] Nutrition   [employer]   Nutrition       Understand the connection between what you eat and how you feel. Hosted by licensed nutritionists and dietitians from [employer] we share practical, real-life solutions for healthier living through nutrition.     General Medication Instructions:   See your medication sheet(s) for instructions.   Take all medications as prescribed.  Make no changes unless your primary care provider suggests them.   Go to all your primary care provider visits.  Be sure to have all your required lab tests. This way, your medicines can be refilled on time.  Do not use any forms of alcohol.    Please Note:  If you have any questions at anytime after you are discharged please call [facility] detox unit 3AW at [phone].  [facility], Behavioral Intake [phone]  Medical Records call [phone]  Outpatient Behavioral Intake call [phone]  LP+ Wait List/Bed Availability call [phone]    Please remember to take all of your behavioral discharge planning and lab paperwork to any follow up appointments, it contains your lab results, diagnosis, medication  list and discharge recommendations.      THANK YOU FOR CHOOSING [facility]

## 2022-08-22 NOTE — PLAN OF CARE
Problem: Alcohol Withdrawal  Goal: Alcohol Withdrawal Symptom Control  Outcome: Ongoing, Progressing   Goal Outcome Evaluation:    The patient continues on MSSA for alcohol withdrawal. Scored 4 during this shift. Patient slept  5 hours this night. The patient reported left hip pain of 8/10 and  migraine headache of 6/ 10. PRN medications were given and pain subsided to 4/10 and 2/10. Patient denied SI,SIB,HI and AV/H. Patient continues on Medical bed for Hx of fibromyalgia and lower leg pain. The 15-minutes safety checks were carried out through this shift without incidence. Will continue to monitor.

## 2022-08-22 NOTE — PLAN OF CARE
"  Problem: Plan of Care - These are the overarching goals to be used throughout the patient stay.    Goal: Plan of Care Review/Shift Note  Description: The Plan of Care Review/Shift note should be completed every shift.  The Outcome Evaluation is a brief statement about your assessment that the patient is improving, declining, or no change.  This information will be displayed automatically on your shift note.  Outcome: Adequate for Care Transition  Flowsheets (Taken 8/22/2022 0900)  Plan of Care Reviewed With: patient  Goal: Patient-Specific Goal (Individualized)  Description: You can add care plan individualizations to a care plan. Examples of Individualization might be:  \"Parent requests to be called daily at 9am for status\", \"I have a hard time hearing out of my right ear\", or \"Do not touch me to wake me up as it startles me\".  Outcome: Adequate for Care Transition  Goal: Absence of Hospital-Acquired Illness or Injury  Outcome: Adequate for Care Transition  Goal: Optimal Comfort and Wellbeing  8/22/2022 1446 by Autumn Joyner RN  Outcome: Adequate for Care Transition  8/22/2022 0943 by Autumn Joyner RN  Outcome: Ongoing, Progressing  Goal: Readiness for Transition of Care  Outcome: Adequate for Care Transition     Problem: Behavioral Health Plan of Care  Goal: Optimal Comfort and Wellbeing  8/22/2022 1446 by Autumn Joyner RN  Outcome: Adequate for Care Transition  8/22/2022 0943 by Autumn Joyner RN  Outcome: Ongoing, Progressing  Goal: Plan of Care Review  Outcome: Adequate for Care Transition  Flowsheets (Taken 8/22/2022 0900)  Plan of Care Reviewed With: patient  Patient Agreement with Plan of Care: agrees  Goal: Patient-Specific Goal (Individualization)  Outcome: Adequate for Care Transition  Goal: Adheres to Safety Considerations for Self and Others  Outcome: Adequate for Care Transition  Goal: Absence of New-Onset Illness or Injury  Outcome: Adequate for Care Transition  Goal: Optimized " Coping Skills in Response to Life Stressors  Outcome: Adequate for Care Transition  Goal: Develops/Participates in Therapeutic [place] to Support Successful Transition  Outcome: Adequate for Care Transition  Goal: Team Discussion  Outcome: Adequate for Care Transition     Problem: Alcohol Withdrawal  Goal: Alcohol Withdrawal Symptom Control  Outcome: Adequate for Care Transition     Problem: Acute Neurologic Deterioration (Alcohol Withdrawal)  Goal: Optimal Neurologic Function  Outcome: Adequate for Care Transition     Problem: Substance Misuse (Alcohol Withdrawal)  Goal: Readiness for Change Identified  Outcome: Adequate for Care Transition     Problem: Substance Withdrawal  Goal: Substance Withdrawal  Description: Signs and symptoms of listed problems will be absent or manageable.  Outcome: Adequate for Care Transition  Goal: Social and Therapeutic (Substance Withdrawal)  Description: Signs and symptoms of listed problems will be absent or manageable.  Outcome: Adequate for Care Transition     Problem: Pain Chronic (Persistent)  Goal: Acceptable Pain Control and Functional Ability  Outcome: Adequate for Care Transition   Goal Outcome Evaluation:    Plan of Care Reviewed With: patient

## 2022-08-22 NOTE — PROGRESS NOTES
Met with pt to discuss aftercare plans. Pt states she is interested in evening outpatient treatment program, in-person or virtual. Pt and writer met, completed CD assessment and pt is set up for [facility] evening virtual IOP program. Coordinated with Aditi Reese who states she will reach out to pt to get the intake set up. Pt has primary Medicare and secondary [facility] PMAP plan, pt signed Medicare Rights form, copy provided to patient and second copy placed in chart. AVS complete, pt ready for discharge.

## 2022-08-22 NOTE — PLAN OF CARE
Behavioral Team Discussion: (8/22/2022)    Continued Stay Criteria/Rationale: Patient admitted for Chemical Use Issues.  Plan: The following services will be provided to the patient; psychiatric assessment, medication management, therapeutic milieu, individual and group support, and skills groups.   Participants: 3A Provider: Dr. Gerald Barone MD; 3A RN: Teresa Herrera, RN; 3A CM's: Sherry Aguila.  Summary/Recommendation: Providers will assess today for treatment recommendations, discharge planning, and aftercare plans. CM will meet with pt for discharge planning.   Medical/Physical: Per ED note:  Past Medical History:   Diagnosis Date     Anxiety       Arthritis       Degenerative arthritis of spine       Depressive disorder       Fibromyalgia       Gastroesophageal reflux disease       Hypertension       Migraine       RODGER (obstructive sleep apnea)      Precautions:   Behavioral Orders   Procedures     Code 1     Fall precautions     Routine Programming     As clinically indicated     Seizure precautions     Status 15     Every 15 minutes.     Withdrawal precautions     Rationale for change in precautions or plan: N/A  Progress: Initial     ASAM Dimension Scale Ratings:  Dimension 1: 4 Client is incapacitated with severe signs and symptoms. Client displays severe withdrawal and is a danger to self or others.  Dimension 2: 2 Client has difficulty tolerating and coping with physical problems or has other biomedical problems that interfere with recovery and treatment. Client neglects or does not seek care for serious biomedical problems.  Dimension 3: 2 Client has difficulty with impulse control and lacks coping skills. Client has thoughts of suicide or harm to others without means; however, the thoughts may interfere with participation in some treatment activities. Client has difficulty functioning in significant life areas. Client has moderate symptoms of emotional, behavioral, or cognitive problems. Client  is able to participate in most treatment activities.  Dimension 4: 2 Client displays verbal compliance, but lacks consistent behaviors; has low motivation for change; and is passively involved in treatment.  Dimension 5: 4 No awareness of the negative impact of mental health problems or substance abuse. No coping skills to arrest mental health or addiction illnesses, or prevent relapse.  Dimension 6: 4 Client has (A) Chronically antagonistic significant other, living environment, family, peer group or long-term criminal justice involvement that is harmful to recovery or treatment progress, or (B) Client has an actively antagonistic significant other, family, work, or living environment with immediate threat to the client's safety and well-being.

## 2022-08-22 NOTE — PLAN OF CARE
Goal Outcome Evaluation:    Plan of Care Reviewed With: patient     Overall Patient Progress: improving    Patient continues in alcohol detox.    She was present in the milieu off and on throughout the evening.  She participated in art therapy group and attended the virtual AA meeting.    She ate 100% of her dinner.    She denied SI/SIB/HI.    She complained of abdominal pain 6/10. She went down for an ultrasound around 1900. Medicine to follow up tomorrow. Pt continues to have a persistent cough. Appears to have relief from benzonatate 100 mg.    1600 vitals: /74  Pulse 70  Temp 97.4  F (36.3  C) (Temporal)  SpO2 96%    2000 vitals: /83  Pulse 64  Temp 97.4  F (36.3  C) (Temporal)  SpO2 96%    MSSA: 3 & 2    PRNs administered this shift: benzonatate 100 mg, zofran 4 mg, tylenol 650 mg    Discharge plans: Pt would like to find an [facility] evening program. She completed her CD paperwork and turned it in.  to follow up tomorrow.
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
## 2022-08-22 NOTE — DISCHARGE SUMMARY
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 for additional details.          Hospital Course:   PATIENT was admitted to [facility]with attending  under DR barone, please review the detailed admit note on 8/20/22   The patient was placed under status 15 (15 minute checks) to ensure patient safety.   MSSA protocol was initiated due to the patient's history of alcohol abuse and concern for withdrawal symptoms.  CBC, BMP and utox obtained.    All outpatient medications were continued   patient was continued on Celexa and BuSpar  patient will be detoxed off clonazepam using phenobarbital 45 mg at bedtime  PATIENTdid participate in groups and was visible in the milieu.     The patient's symptoms of alcohol improved.     Patients energy motivation , sleep appetite improved.  Pt completed detox . It was un eventful.      Discussed with patient medications for craving.  Spoke with patient about triggers coping skills relapse prevention.    CONSULTS DONE DURING PATIENTS HOSPITALIZATION.  Patient was seen by medicine on date8/21/22    This as per their medical consult    SUBJECTIVE   CC:   Alcohol withdrawal    Assessment and Plan/Recommendations:      Brittani Linares is a 56 year old female with a PMH of s/p gastric bypass surgery, HTN, migraines, GERD, anxiety, depression, and alcohol abuse. She reported to the ED seeking detox. Was admitted to Detox unit with medicine following for co-managment.      Addendum 1412  Pt had no medical complaints this morning  but asked to be seen this afternoon for 1) LE edema and 2) epigastric discomfort. On discussion she reports that the LE edema has been present on and off for decades but worse over the past 2 months. No CP, SOB or palpitations. The epigastric pain has been present on and off for even longer, mostly since she had her gastric bypass surgery, however, she reports it is more uncomfortably currently in the setting of EtOH use and her ongoing cough. Notably, she does have some RUQ/epigasrtic tenderness and subjectively positive Cavazos's sign, however, she reports hx of cholecystectomy      # LE Edema  - PO lasix 20 mg x 1   - Give 20 mEq of KCl PO   - Check CMP in the AM to monitor potassium   - Continue PTA BP meds including hydrochlorothiazide   - Echocardiogram ordered. If this is unable to get done as an inpatient it should not delay discharge and can be deferred to outpatient.     # RUQ pain and tenderness, chronic   # Transaminitis   # Hx of gastric bypass   ALT 73, AST 96 but alk phos and bilirubin normal.  Repeat CMP this morning shows improvement in LFTs. Pt wanted me to assess her for abdominal pain this afternoon. This sounds like a chronic issue that has been present for several years and in the setting of mulitple prior abdominal surgeries including gastric bypass, multiple hernia surgeries, and prior cholecystectomy. However, she reports it is worse over the past couple of days in the setting of increased EtOH use. She does have some RUQ and epigastric tenderness and appreciable + Cavazos's signo on exam but her abdomen is otherwise benign. Her vitals are stable, afebrile. I do not suspect any acute infectious intraabdominal process. I suspect the nature of her discomfort is chronic   - Ordered RUQ US for further evaluation  - Repeat CMP in the AM. It is reassuring that her LFTs have improved since admission.      # Alcohol withdrawal, hx of alcohol use disorder   MSSA 2 this shift.  Denies hx of withdrawal  seizures.  Pt reports drinking 750 ml vodka dialy plus beer and wine. Blood EtOH on arrival was 0.07.   - Continue MSSA   - Folvite, multi-vites, thiamine supplementation   - Further management per Psychiatry      # Cough   # GERD   Pt reports cough for the past few days. Has a harsh, dry cough while in room. COVID swab negative   - Tessalon pearls 100 mg TID PRN for cough  - PO Protonix      # Hypokalemia - resolved   K 2.9 in the ER. Likely secondary to GI losses in setting of withdrawal plus being on thiazide diuretic. Received 10 mEq IV replacement plus 40 mEq PO replacement.    - Repeat K this morning normal at 3.8     # Hypertension   On Losartan 100 mg, Metoprolol XL 25 mg, and hydrochlorothiazide 25 mg PTA. Also Verapamil for migraine prevention.  - Continue PTA Losartan, Metoprolol and hydrochlorothiazide  - Continue PTA Potassium supplementation   - Clonidine 0.1 mg PRN for SBP > 170 or DBP >110     # Hypothyroidism  - PTA Levothyroxine      # Migraines   - PTA Verapamil   - PTA Sumatriptan as needed      # Anxiety   # Depression  # ADHD  On trazadone, Celexa, and Ativan PRN PTA. Amphetamines elevated on Utox but pt is on Adderall.   - management per psychiatry         Medicine will continue to follow for above lab monitoring and for echo and RUQ US that were ordered. However, please note that none of these tests are deemed urgent and if she is unable to complete RUQ US and Echo prior to discharge then it is acceptable to have these done as an outpt.               Pt was seen by cm  As per recommendations from cm    Met with pt to discuss aftercare plans. Pt states she is interested in evening outpatient treatment program, in-person or virtual. Pt and writer met, completed CD assessment and pt is set up for [facility] evening virtual IOP program. Coordinated with Aditi Reese who states she will reach out to pt to get the intake set up. Pt has primary Medicare and secondary [facility] PMAP plan, pt  signed Medicare Rights form, copy provided to patient and second copy placed in chart. AVS complete, pt ready for discharge.                   Labs:reviewed with patient       Recent Results (from the past 48 hour(s))   Asymptomatic COVID-19 Virus (Coronavirus) by PCR Nasopharyngeal    Collection Time: 08/20/22 10:28 AM    Specimen: Nasopharyngeal; Swab   Result Value Ref Range    SARS CoV2 PCR Negative Negative   Comprehensive metabolic panel    Collection Time: 08/20/22 10:41 AM   Result Value Ref Range    Sodium 135 133 - 144 mmol/L    Potassium 2.9 (L) 3.4 - 5.3 mmol/L    Chloride 98 94 - 109 mmol/L    Carbon Dioxide (CO2) 30 20 - 32 mmol/L    Anion Gap 7 3 - 14 mmol/L    Urea Nitrogen 7 7 - 30 mg/dL    Creatinine 0.83 0.52 - 1.04 mg/dL    Calcium 8.9 8.5 - 10.1 mg/dL    Glucose 91 70 - 99 mg/dL    Alkaline Phosphatase 141 40 - 150 U/L    AST 96 (H) 0 - 45 U/L    ALT 73 (H) 0 - 50 U/L    Protein Total 7.3 6.8 - 8.8 g/dL    Albumin 3.7 3.4 - 5.0 g/dL    Bilirubin Total 0.7 0.2 - 1.3 mg/dL    GFR Estimate 82 >60 mL/min/1.73m2   Magnesium    Collection Time: 08/20/22 10:41 AM   Result Value Ref Range    Magnesium 2.2 1.6 - 2.3 mg/dL   Lipase    Collection Time: 08/20/22 10:41 AM   Result Value Ref Range    Lipase 96 73 - 393 U/L   Ethyl Alcohol Level    Collection Time: 08/20/22 10:41 AM   Result Value Ref Range    Alcohol ethyl 0.07 (H) <=0.01 g/dL   CBC with platelets and differential    Collection Time: 08/20/22 10:41 AM   Result Value Ref Range    WBC Count 5.3 4.0 - 11.0 10e3/uL    RBC Count 5.12 3.80 - 5.20 10e6/uL    Hemoglobin 15.1 11.7 - 15.7 g/dL    Hematocrit 43.8 35.0 - 47.0 %    MCV 86 78 - 100 fL    MCH 29.5 26.5 - 33.0 pg    MCHC 34.5 31.5 - 36.5 g/dL    RDW 14.7 10.0 - 15.0 %    Platelet Count 248 150 - 450 10e3/uL    % Neutrophils 65 %    % Lymphocytes 21 %    % Monocytes 9 %    % Eosinophils 4 %    % Basophils 1 %    % Immature Granulocytes 0 %    NRBCs per 100 WBC 0 <1 /100    Absolute  Neutrophils 3.5 1.6 - 8.3 10e3/uL    Absolute Lymphocytes 1.1 0.8 - 5.3 10e3/uL    Absolute Monocytes 0.5 0.0 - 1.3 10e3/uL    Absolute Eosinophils 0.2 0.0 - 0.7 10e3/uL    Absolute Basophils 0.1 0.0 - 0.2 10e3/uL    Absolute Immature Granulocytes 0.0 <=0.4 10e3/uL    Absolute NRBCs 0.0 10e3/uL   Drug abuse screen 1 urine (ED)    Collection Time: 08/20/22 11:31 AM   Result Value Ref Range    Amphetamines Urine Screen Positive (A) Screen Negative    Barbiturates Urine Screen Negative Screen Negative    Benzodiazepines Urine Screen Negative Screen Negative    Cannabinoids Urine Screen Negative Screen Negative    Cocaine Urine Screen Negative Screen Negative    Opiates Urine Screen Negative Screen Negative   GGT    Collection Time: 08/21/22  6:49 AM   Result Value Ref Range     (H) 0 - 40 U/L   Hemoglobin A1c    Collection Time: 08/21/22  6:49 AM   Result Value Ref Range    Hemoglobin A1C 5.2 0.0 - 5.6 %   TSH with free T4 reflex and/or T3 as indicated    Collection Time: 08/21/22  6:49 AM   Result Value Ref Range    TSH 2.87 0.40 - 4.00 mU/L   Comprehensive metabolic panel    Collection Time: 08/21/22  6:49 AM   Result Value Ref Range    Sodium 141 133 - 144 mmol/L    Potassium 3.8 3.4 - 5.3 mmol/L    Chloride 104 94 - 109 mmol/L    Carbon Dioxide (CO2) 31 20 - 32 mmol/L    Anion Gap 6 3 - 14 mmol/L    Urea Nitrogen 11 7 - 30 mg/dL    Creatinine 1.00 0.52 - 1.04 mg/dL    Calcium 8.7 8.5 - 10.1 mg/dL    Glucose 91 70 - 99 mg/dL    Alkaline Phosphatase 114 40 - 150 U/L    AST 44 0 - 45 U/L    ALT 51 (H) 0 - 50 U/L    Protein Total 6.1 (L) 6.8 - 8.8 g/dL    Albumin 3.0 (L) 3.4 - 5.0 g/dL    Bilirubin Total 0.7 0.2 - 1.3 mg/dL    GFR Estimate 65 >60 mL/min/1.73m2   Comprehensive metabolic panel    Collection Time: 08/22/22  8:39 AM   Result Value Ref Range    Sodium 138 133 - 144 mmol/L    Potassium 3.4 3.4 - 5.3 mmol/L    Chloride 101 94 - 109 mmol/L    Carbon Dioxide (CO2) 33 (H) 20 - 32 mmol/L    Anion Gap 4 3 -  14 mmol/L    Urea Nitrogen 10 7 - 30 mg/dL    Creatinine 0.92 0.52 - 1.04 mg/dL    Calcium 9.0 8.5 - 10.1 mg/dL    Glucose 137 (H) 70 - 99 mg/dL    Alkaline Phosphatase 124 40 - 150 U/L    AST 31 0 - 45 U/L    ALT 48 0 - 50 U/L    Protein Total 6.8 6.8 - 8.8 g/dL    Albumin 3.2 (L) 3.4 - 5.0 g/dL    Bilirubin Total 0.6 0.2 - 1.3 mg/dL    GFR Estimate 72 >60 mL/min/1.73m2         Recent Results (from the past 240 hour(s))   Asymptomatic COVID-19 Virus (Coronavirus) by PCR Nasopharyngeal    Collection Time: 08/20/22 10:28 AM    Specimen: Nasopharyngeal; Swab   Result Value Ref Range    SARS CoV2 PCR Negative Negative   Comprehensive metabolic panel    Collection Time: 08/20/22 10:41 AM   Result Value Ref Range    Sodium 135 133 - 144 mmol/L    Potassium 2.9 (L) 3.4 - 5.3 mmol/L    Chloride 98 94 - 109 mmol/L    Carbon Dioxide (CO2) 30 20 - 32 mmol/L    Anion Gap 7 3 - 14 mmol/L    Urea Nitrogen 7 7 - 30 mg/dL    Creatinine 0.83 0.52 - 1.04 mg/dL    Calcium 8.9 8.5 - 10.1 mg/dL    Glucose 91 70 - 99 mg/dL    Alkaline Phosphatase 141 40 - 150 U/L    AST 96 (H) 0 - 45 U/L    ALT 73 (H) 0 - 50 U/L    Protein Total 7.3 6.8 - 8.8 g/dL    Albumin 3.7 3.4 - 5.0 g/dL    Bilirubin Total 0.7 0.2 - 1.3 mg/dL    GFR Estimate 82 >60 mL/min/1.73m2   Magnesium    Collection Time: 08/20/22 10:41 AM   Result Value Ref Range    Magnesium 2.2 1.6 - 2.3 mg/dL   Lipase    Collection Time: 08/20/22 10:41 AM   Result Value Ref Range    Lipase 96 73 - 393 U/L   Ethyl Alcohol Level    Collection Time: 08/20/22 10:41 AM   Result Value Ref Range    Alcohol ethyl 0.07 (H) <=0.01 g/dL   CBC with platelets and differential    Collection Time: 08/20/22 10:41 AM   Result Value Ref Range    WBC Count 5.3 4.0 - 11.0 10e3/uL    RBC Count 5.12 3.80 - 5.20 10e6/uL    Hemoglobin 15.1 11.7 - 15.7 g/dL    Hematocrit 43.8 35.0 - 47.0 %    MCV 86 78 - 100 fL    MCH 29.5 26.5 - 33.0 pg    MCHC 34.5 31.5 - 36.5 g/dL    RDW 14.7 10.0 - 15.0 %    Platelet Count  248 150 - 450 10e3/uL    % Neutrophils 65 %    % Lymphocytes 21 %    % Monocytes 9 %    % Eosinophils 4 %    % Basophils 1 %    % Immature Granulocytes 0 %    NRBCs per 100 WBC 0 <1 /100    Absolute Neutrophils 3.5 1.6 - 8.3 10e3/uL    Absolute Lymphocytes 1.1 0.8 - 5.3 10e3/uL    Absolute Monocytes 0.5 0.0 - 1.3 10e3/uL    Absolute Eosinophils 0.2 0.0 - 0.7 10e3/uL    Absolute Basophils 0.1 0.0 - 0.2 10e3/uL    Absolute Immature Granulocytes 0.0 <=0.4 10e3/uL    Absolute NRBCs 0.0 10e3/uL   Drug abuse screen 1 urine (ED)    Collection Time: 08/20/22 11:31 AM   Result Value Ref Range    Amphetamines Urine Screen Positive (A) Screen Negative    Barbiturates Urine Screen Negative Screen Negative    Benzodiazepines Urine Screen Negative Screen Negative    Cannabinoids Urine Screen Negative Screen Negative    Cocaine Urine Screen Negative Screen Negative    Opiates Urine Screen Negative Screen Negative   GGT    Collection Time: 08/21/22  6:49 AM   Result Value Ref Range     (H) 0 - 40 U/L   Hemoglobin A1c    Collection Time: 08/21/22  6:49 AM   Result Value Ref Range    Hemoglobin A1C 5.2 0.0 - 5.6 %   TSH with free T4 reflex and/or T3 as indicated    Collection Time: 08/21/22  6:49 AM   Result Value Ref Range    TSH 2.87 0.40 - 4.00 mU/L   Comprehensive metabolic panel    Collection Time: 08/21/22  6:49 AM   Result Value Ref Range    Sodium 141 133 - 144 mmol/L    Potassium 3.8 3.4 - 5.3 mmol/L    Chloride 104 94 - 109 mmol/L    Carbon Dioxide (CO2) 31 20 - 32 mmol/L    Anion Gap 6 3 - 14 mmol/L    Urea Nitrogen 11 7 - 30 mg/dL    Creatinine 1.00 0.52 - 1.04 mg/dL    Calcium 8.7 8.5 - 10.1 mg/dL    Glucose 91 70 - 99 mg/dL    Alkaline Phosphatase 114 40 - 150 U/L    AST 44 0 - 45 U/L    ALT 51 (H) 0 - 50 U/L    Protein Total 6.1 (L) 6.8 - 8.8 g/dL    Albumin 3.0 (L) 3.4 - 5.0 g/dL    Bilirubin Total 0.7 0.2 - 1.3 mg/dL    GFR Estimate 65 >60 mL/min/1.73m2   Comprehensive metabolic panel    Collection Time:  08/22/22  8:39 AM   Result Value Ref Range    Sodium 138 133 - 144 mmol/L    Potassium 3.4 3.4 - 5.3 mmol/L    Chloride 101 94 - 109 mmol/L    Carbon Dioxide (CO2) 33 (H) 20 - 32 mmol/L    Anion Gap 4 3 - 14 mmol/L    Urea Nitrogen 10 7 - 30 mg/dL    Creatinine 0.92 0.52 - 1.04 mg/dL    Calcium 9.0 8.5 - 10.1 mg/dL    Glucose 137 (H) 70 - 99 mg/dL    Alkaline Phosphatase 124 40 - 150 U/L    AST 31 0 - 45 U/L    ALT 48 0 - 50 U/L    Protein Total 6.8 6.8 - 8.8 g/dL    Albumin 3.2 (L) 3.4 - 5.0 g/dL    Bilirubin Total 0.6 0.2 - 1.3 mg/dL    GFR Estimate 72 >60 mL/min/1.73m2            Because this patient meets criteria for an Alcohol Use Disorder, I performed the following brief intervention on the date of this note:              1) Expressed concern that the patient is drinking at unhealthy levels known to increase their risk of alcohol related problems              2) Gave feedback linking alcohol use and health, including personalized feedback explaining how alcohol use can interact with their medical and/or psychiatric problems, and with prescribed medications.              3) Advised patient to abstain.    PT counseled on nicotine cessation and nicotine replacement provided    Discussed with patient many issues of addiction,triggers, relapse, and establishing a solid recovery program.    DISCHARGE MENTAL STATUS EXAMINATION:  The patient is alert, oriented x3.  Good fund of knowledge.  Good use of language.  Recent and remote memory, language, fund of knowledge are all adequate.  Euthymic mood congruent affect  Speech normal rate/rhythm linear tp no loose asso,The patient does not have any active suicidal or homicidal ideation.  Does not have any auditory or visual hallucination.  Fair insight/judgment At this time, the patient was stable to be discharged.        Pt was not determined to not be a danger to himself or others. At the current time of discharge, the patient does not meet criteria for involuntary  hospitalization. On the day of discharge, the patient reports that they do not have suicidal or homicidal ideation and would never hurt themselves or others. Steps taken to minimize risk include: assessing patient s behavior and thought process daily during hospital stay, discharging patient with adequate plan for follow up for mental and physical health and discussing safety plan of returning to the hospital should the patient ever have thoughts of harming themselves or others. Therefore, based on all available evidence including the factors cited above, the patient does not appear to be at imminent risk for self-harm, and is appropriate for outpatient level of care.     Educated about side effects/risk vs benefits /alternative including non treatment.Pt consented to be on medication.     .Total time spent on discharge summary more than 35 min  More than  20 min  planning, coordination of care, medication reconciliation and performance of physical exam on day of discharge.Care was coordinated with unit RN and unit therapist         Review of your medicines      UNREVIEWED medicines. Ask your doctor about these medicines      Dose / Directions   acetaminophen 500 MG tablet  Commonly known as: TYLENOL  Indication: Arthritis pain and inflamation      Dose: 500 mg  Take 500 mg by mouth 2 times daily  Refills: 0     albuterol 108 (90 Base) MCG/ACT inhaler  Commonly known as: PROAIR HFA/PROVENTIL HFA/VENTOLIN HFA      Dose: 1-2 puff  Inhale 1-2 puffs into the lungs every 4 hours as needed for shortness of breath / dyspnea  Refills: 0     amphetamine-dextroamphetamine 30 MG 24 hr capsule  Commonly known as: ADDERALL XR      Dose: 30 mg  Take 30 mg by mouth every morning  Refills: 0     busPIRone 15 MG tablet  Commonly known as: BUSPAR      Dose: 30 mg  Take 30 mg by mouth 2 times daily  Refills: 0     citalopram 40 MG tablet  Commonly known as: celeXA      Dose: 40 mg  Take 40 mg by mouth daily  Refills: 0     clonazePAM  1 MG tablet  Commonly known as: klonoPIN      Dose: 1.5 mg  Take 1.5 mg by mouth daily  Refills: 0     hydrochlorothiazide 25 MG tablet  Commonly known as: HYDRODIURIL      Dose: 25 mg  Take 25 mg by mouth daily  Refills: 0     levothyroxine 150 MCG tablet  Commonly known as: SYNTHROID/LEVOTHROID  Ask about: Which instructions should I use?      Dose: 150 mcg  Take 150 mcg by mouth daily  Refills: 0     LORazepam 0.5 MG tablet  Commonly known as: ATIVAN      Dose: 0.5 mg  Take 0.5 mg by mouth every 6 hours as needed for anxiety  Refills: 0     losartan 100 MG tablet  Commonly known as: COZAAR      Dose: 100 mg  Take 100 mg by mouth daily  Refills: 0     metoprolol succinate ER 25 MG 24 hr tablet  Commonly known as: TOPROL XL      Dose: 25 mg  Take 25 mg by mouth daily  Refills: 0     omeprazole 20 MG DR capsule  Commonly known as: priLOSEC      Dose: 20 mg  Take 20 mg by mouth daily  Refills: 0     potassium chloride ER 20 MEQ CR tablet  Commonly known as: K-TAB      Dose: 20 mEq  Take 20 mEq by mouth daily  Refills: 0     SUMAtriptan 100 MG tablet  Commonly known as: IMITREX      Dose: 100 mg  Take 100 mg by mouth 2 times daily as needed for migraine Give at least 2 hours apart. Max dose 200 mg per 24 hours.  Refills: 0     traZODone 50 MG tablet  Commonly known as: DESYREL      Dose: 100-150 mg  Take 100-150 mg by mouth At Bedtime  Refills: 0     verapamil  MG CR tablet  Commonly known as: CALAN-SR      Dose: 480 mg  Take 480 mg by mouth daily  Refills: 0             Disposition: home     Facts about COVID19 at www.cdc.gov/COVID19 and www.[place].gov/covid19     Keeping hands clean is one of the most important steps we can take to avoid getting sick and spreading germs to others.  Please wash your hands frequently and lather with soap for at least 20 seconds!     Medical Follow-Up:  Marc cespedes 8/23/22     Treatment Follow-Up:Treatment Follow-Up:  [facility] evening [facility]  Outpatient intake  "department will be calling to schedule your intake to this program  Aditi Mary Ann: [phone]    .        \"Much or all of the text in this note was generated through the use of Dragon Dictate voice to text software. Errors in spelling or words which appear to be out of contact are unintentional, may be present due having escaped editing\"     "

## 2022-08-22 NOTE — PROGRESS NOTES
"   08/21/22 2200   Group Therapy Session   Group Attendance attended group session   Time Session Began 1645   Time Session Ended 1730   Total Time patient participated (minutes) 45   Total # Attendees 6   Group Type expressive therapy   Group Topic Covered emotions/expression   Patient Response/Contribution cooperative with task     Art Therapy directive was to create a \"Tree of Life\" drawing using metaphor to create a personal self narrative.  Goals of directive: to express aspects of self using metaphor, identifying personal strengths and goals, emotional expression, media exploration.  Pt was an engaged participant, focused on task for the full duration of group. Pt completed drawing and a poem and briefly shared with group. Pt wrote a very thoughtful poem with a theme of change/healing.  Pts mood was calm, pleasant participant.         "

## 2022-08-22 NOTE — PROGRESS NOTES
SPIRITUAL HEALTH SERVICES  SPIRITUAL ASSESSMENT Progress Note  [facility] ([facility]) 3A  [facility]   ON-CALL VISIT    REFERRAL SOURCE: Routine request at admission.     I contacted unit to determine time to visit patient today. Informed by unit staff that patient is currently being  discharged.     PLAN: No follow-up planned.     Peace Xavier    Pager [phone]    [facility] remains available 24/7 for emergent requests/referrals, either by having the switchboard page the on-call  or by entering an ASAP/STAT consult in Epic (this will also page the on-call ).

## 2022-08-25 ENCOUNTER — TELEPHONE (OUTPATIENT)
Dept: BEHAVIORAL HEALTH | Facility: CLINIC | Age: 57
End: 2022-08-25

## 2022-08-25 NOTE — TELEPHONE ENCOUNTER
Donaldo to schedule CD IOP   Patient would like communication of their results via:    Cell Phone:   Telephone Information:   Mobile [phone]     Okay to leave a message containing results? Yes     There are no preventive care reminders to display for this patient.  Patient is up to date, no discussion needed..

## 2022-09-08 ENCOUNTER — TELEPHONE (OUTPATIENT)
Dept: BEHAVIORAL HEALTH | Facility: CLINIC | Age: 57
End: 2022-09-08

## 2022-10-15 ENCOUNTER — HEALTH MAINTENANCE LETTER (OUTPATIENT)
Age: 57
End: 2022-10-15

## 2023-10-29 ENCOUNTER — HEALTH MAINTENANCE LETTER (OUTPATIENT)
Age: 58
End: 2023-10-29

## 2024-12-21 ENCOUNTER — HEALTH MAINTENANCE LETTER (OUTPATIENT)
Age: 59
End: 2024-12-21